# Patient Record
Sex: FEMALE | Race: WHITE | Employment: OTHER | URBAN - METROPOLITAN AREA
[De-identification: names, ages, dates, MRNs, and addresses within clinical notes are randomized per-mention and may not be internally consistent; named-entity substitution may affect disease eponyms.]

---

## 2017-06-12 ENCOUNTER — LAB ENCOUNTER (OUTPATIENT)
Dept: LAB | Facility: HOSPITAL | Age: 77
End: 2017-06-12
Attending: INTERNAL MEDICINE
Payer: MEDICARE

## 2017-06-12 ENCOUNTER — OFFICE VISIT (OUTPATIENT)
Dept: INTERNAL MEDICINE CLINIC | Facility: CLINIC | Age: 77
End: 2017-06-12

## 2017-06-12 VITALS
RESPIRATION RATE: 18 BRPM | HEIGHT: 66.6 IN | BODY MASS INDEX: 27.18 KG/M2 | HEART RATE: 54 BPM | DIASTOLIC BLOOD PRESSURE: 84 MMHG | WEIGHT: 171.13 LBS | TEMPERATURE: 97 F | SYSTOLIC BLOOD PRESSURE: 126 MMHG

## 2017-06-12 DIAGNOSIS — E78.5 HYPERLIPIDEMIA, UNSPECIFIED HYPERLIPIDEMIA TYPE: ICD-10-CM

## 2017-06-12 DIAGNOSIS — Z12.11 COLON CANCER SCREENING: ICD-10-CM

## 2017-06-12 DIAGNOSIS — M85.80 OSTEOPENIA, UNSPECIFIED LOCATION: ICD-10-CM

## 2017-06-12 DIAGNOSIS — Z00.00 ENCOUNTER FOR ANNUAL HEALTH EXAMINATION: Primary | ICD-10-CM

## 2017-06-12 DIAGNOSIS — Z23 NEED FOR VACCINATION: ICD-10-CM

## 2017-06-12 DIAGNOSIS — Z12.31 VISIT FOR SCREENING MAMMOGRAM: ICD-10-CM

## 2017-06-12 DIAGNOSIS — N95.9 MENOPAUSAL AND PERIMENOPAUSAL DISORDER: ICD-10-CM

## 2017-06-12 PROCEDURE — G0009 ADMIN PNEUMOCOCCAL VACCINE: HCPCS | Performed by: INTERNAL MEDICINE

## 2017-06-12 PROCEDURE — 85025 COMPLETE CBC W/AUTO DIFF WBC: CPT

## 2017-06-12 PROCEDURE — 84443 ASSAY THYROID STIM HORMONE: CPT

## 2017-06-12 PROCEDURE — 82607 VITAMIN B-12: CPT

## 2017-06-12 PROCEDURE — G0439 PPPS, SUBSEQ VISIT: HCPCS | Performed by: INTERNAL MEDICINE

## 2017-06-12 PROCEDURE — 80061 LIPID PANEL: CPT

## 2017-06-12 PROCEDURE — 90670 PCV13 VACCINE IM: CPT | Performed by: INTERNAL MEDICINE

## 2017-06-12 PROCEDURE — 80053 COMPREHEN METABOLIC PANEL: CPT

## 2017-06-12 PROCEDURE — 96160 PT-FOCUSED HLTH RISK ASSMT: CPT | Performed by: INTERNAL MEDICINE

## 2017-06-12 PROCEDURE — 36415 COLL VENOUS BLD VENIPUNCTURE: CPT

## 2017-06-12 RX ORDER — IMIQUIMOD 12.5 MG/.25G
1 CREAM TOPICAL NIGHTLY
COMMUNITY
Start: 2017-03-23 | End: 2017-06-12

## 2017-06-12 NOTE — PATIENT INSTRUCTIONS
Ja Flores SCREENING SCHEDULE   Tests on this list are recommended by your physician but may not be covered, or covered at this frequency, by your insurer. Please check with your insurance carrier before scheduling to verify coverage.    PREVENTATIVE often if abnormal Colonoscopy,10 Years due on 01/06/2013 Update Health Maintenance if applicable    Flex Sigmoidoscopy Screen  Covered every 5 years No results found for this or any previous visit. No flowsheet data found.      Fecal Occult Blood   Covered (Pneumovax)  Covered Once after 65 No orders found for this or any previous visit. Please get once after your 65th birthday    Hepatitis B for Moderate/High Risk       No orders found for this or any previous visit.  Medium/high risk factors:   End-stage re

## 2017-06-12 NOTE — PROGRESS NOTES
HPI:   Nakul Figueroa is a 68year old female who presents for a Medicare Subsequent Annual Wellness visit (Pt already had Initial Annual Wellness).     Patient is here requesting Medicare annual wellness visit as well as follow-up on chronic medical issue Take 1 capsule by mouth daily. Multiple Vitamin (MULTI-VITAMINS) Oral Tab Take 1 tablet by mouth daily. CALCIUM CITRATE-VITAMIN D3 OR Take 1 tablet by mouth daily. ASCORBIC ACID CR OR Take 1 tablet by mouth daily.         MEDICAL INFORMATION: the same time:    No   I have trouble understanding things on the TV:  No I have to strain to   understand conversations:  No   I have to worry about missing the telephone ring or doorbell:  No I have   trouble hearing conversations in a noisy background s bilaterally, respirations unlabored   Heart:  Regular rate and rhythm, S1 and S2 normal, no murmur, rub, or gallop   Abdomen:   Soft, non-tender, bowel sounds active all four quadrants,  no masses, no organomegaly   Pelvic:  deferred   Breast exam–normal b METABOLIC PANEL (14), LIPID PANEL, ASSAY,         THYROID STIM HORMONE, VITAMIN B12         She takes no medications. Check blood work and contact patient with results.   Work on diet and exercise.    (Z12.31) Visit for screening mammogram  Plan: EDUARD DUKE such as Influenza, Hepatitis B, Tetanus, or Pneumococcal?: No     Functional Ability     Bathing or Showering: Able without help    Toileting: Able without help    Dressing: Able without help    Eating: Able without help    Driving: Able without help    Pr quick review of chart, separate sheet to patient  [address] Internal Lab or Procedure External Lab or Procedure   Diabetes Screening      HbgA1C   Annually No results found for: A1C No flowsheet data found.     Fasting vaccine history found Please get once after your 65th birthday    Hepatitis B for Moderate/High Risk No vaccine history found Medium/high risk factors:   End-stage renal disease   Hemophiliacs who received Factor VIII or IX concentrates   Clients of instit

## 2017-06-13 ENCOUNTER — APPOINTMENT (OUTPATIENT)
Dept: LAB | Facility: HOSPITAL | Age: 77
End: 2017-06-13
Attending: INTERNAL MEDICINE
Payer: MEDICARE

## 2017-06-13 DIAGNOSIS — Z12.11 COLON CANCER SCREENING: ICD-10-CM

## 2017-06-13 PROCEDURE — 82274 ASSAY TEST FOR BLOOD FECAL: CPT

## 2017-06-19 ENCOUNTER — OFFICE VISIT (OUTPATIENT)
Dept: INTERNAL MEDICINE CLINIC | Facility: CLINIC | Age: 77
End: 2017-06-19

## 2017-06-19 VITALS
DIASTOLIC BLOOD PRESSURE: 82 MMHG | WEIGHT: 170 LBS | HEART RATE: 56 BPM | BODY MASS INDEX: 27 KG/M2 | RESPIRATION RATE: 18 BRPM | HEIGHT: 66.6 IN | SYSTOLIC BLOOD PRESSURE: 124 MMHG | TEMPERATURE: 98 F

## 2017-06-19 DIAGNOSIS — T50.Z95A ADVERSE REACTION TO VACCINE, INITIAL ENCOUNTER: Primary | ICD-10-CM

## 2017-06-19 PROCEDURE — G0463 HOSPITAL OUTPT CLINIC VISIT: HCPCS | Performed by: INTERNAL MEDICINE

## 2017-06-19 PROCEDURE — 99213 OFFICE O/P EST LOW 20 MIN: CPT | Performed by: INTERNAL MEDICINE

## 2017-06-19 RX ORDER — CEFADROXIL 500 MG/1
500 CAPSULE ORAL 2 TIMES DAILY
Qty: 14 CAPSULE | Refills: 0 | Status: SHIPPED | OUTPATIENT
Start: 2017-06-19 | End: 2017-06-26

## 2017-06-19 NOTE — PROGRESS NOTES
HPI:    Patient ID: Nakul Figueroa is a 68year old female. HPI  Patient is here complaining of redness and soreness at site of pneumonia vaccination. She was seen in the office last week on June 12. We gave her a Prevnar 13 pneumonia shot.   History of space simple sensitivity reaction. She did have a bad reaction to the tetanus shot in the past.  Advised cool compresses to the area and Benadryl. Given the time course and the potential for secondary cellulitis, we will also place her on Duricef.   Sameer Galindo

## 2017-06-19 NOTE — PATIENT INSTRUCTIONS
Benardyl 25 mg pills, 1-2 pills every 6 hours if needed. Cool compresses to the area thee times a day. Duricef. Call if not better in a couple days.

## 2017-06-27 ENCOUNTER — HOSPITAL ENCOUNTER (OUTPATIENT)
Dept: MAMMOGRAPHY | Facility: HOSPITAL | Age: 77
Discharge: HOME | End: 2017-06-27
Attending: INTERNAL MEDICINE
Payer: MEDICARE

## 2017-06-27 ENCOUNTER — HOSPITAL ENCOUNTER (OUTPATIENT)
Dept: BONE DENSITY | Facility: HOSPITAL | Age: 77
Discharge: HOME | End: 2017-06-27
Attending: INTERNAL MEDICINE
Payer: MEDICARE

## 2017-06-27 DIAGNOSIS — Z12.31 VISIT FOR SCREENING MAMMOGRAM: ICD-10-CM

## 2017-06-27 DIAGNOSIS — M85.80 OSTEOPENIA, UNSPECIFIED LOCATION: ICD-10-CM

## 2017-06-27 DIAGNOSIS — N95.9 MENOPAUSAL AND PERIMENOPAUSAL DISORDER: ICD-10-CM

## 2017-06-27 PROCEDURE — 77067 SCR MAMMO BI INCL CAD: CPT | Performed by: INTERNAL MEDICINE

## 2017-06-27 PROCEDURE — 77080 DXA BONE DENSITY AXIAL: CPT | Performed by: INTERNAL MEDICINE

## 2018-01-31 ENCOUNTER — TELEPHONE (OUTPATIENT)
Dept: INTERNAL MEDICINE CLINIC | Facility: CLINIC | Age: 78
End: 2018-01-31

## 2018-02-13 ENCOUNTER — TELEPHONE (OUTPATIENT)
Dept: INTERNAL MEDICINE CLINIC | Facility: CLINIC | Age: 78
End: 2018-02-13

## 2018-06-21 ENCOUNTER — OFFICE VISIT (OUTPATIENT)
Dept: INTERNAL MEDICINE CLINIC | Facility: CLINIC | Age: 78
End: 2018-06-21

## 2018-06-21 ENCOUNTER — LAB ENCOUNTER (OUTPATIENT)
Dept: LAB | Facility: HOSPITAL | Age: 78
End: 2018-06-21
Attending: INTERNAL MEDICINE
Payer: MEDICARE

## 2018-06-21 VITALS
SYSTOLIC BLOOD PRESSURE: 120 MMHG | WEIGHT: 168.88 LBS | DIASTOLIC BLOOD PRESSURE: 66 MMHG | RESPIRATION RATE: 18 BRPM | HEIGHT: 66.5 IN | BODY MASS INDEX: 26.82 KG/M2 | HEART RATE: 50 BPM | TEMPERATURE: 98 F

## 2018-06-21 DIAGNOSIS — E78.5 HYPERLIPIDEMIA, UNSPECIFIED HYPERLIPIDEMIA TYPE: ICD-10-CM

## 2018-06-21 DIAGNOSIS — Z00.00 ENCOUNTER FOR ANNUAL HEALTH EXAMINATION: Primary | ICD-10-CM

## 2018-06-21 PROCEDURE — 84443 ASSAY THYROID STIM HORMONE: CPT

## 2018-06-21 PROCEDURE — 85025 COMPLETE CBC W/AUTO DIFF WBC: CPT

## 2018-06-21 PROCEDURE — 96160 PT-FOCUSED HLTH RISK ASSMT: CPT | Performed by: INTERNAL MEDICINE

## 2018-06-21 PROCEDURE — 80053 COMPREHEN METABOLIC PANEL: CPT

## 2018-06-21 PROCEDURE — 36415 COLL VENOUS BLD VENIPUNCTURE: CPT

## 2018-06-21 PROCEDURE — 99397 PER PM REEVAL EST PAT 65+ YR: CPT | Performed by: INTERNAL MEDICINE

## 2018-06-21 PROCEDURE — G0439 PPPS, SUBSEQ VISIT: HCPCS | Performed by: INTERNAL MEDICINE

## 2018-06-21 PROCEDURE — 80061 LIPID PANEL: CPT

## 2018-06-21 RX ORDER — CIPROFLOXACIN 500 MG/1
TABLET, FILM COATED ORAL
COMMUNITY
Start: 2015-10-07 | End: 2018-06-21 | Stop reason: ALTCHOICE

## 2018-06-21 NOTE — PROGRESS NOTES
HPI:   Nell Frederick is a 66year old female who presents for a MA (Medicare Advantage) Supervisit (Once per calendar year). Patient is here for physical exam, AHA annual visit. Last seen here 1 year ago. She is relatively healthy.   She has some hyp Power of [employer] for [employer] on file in [address]    Advance care planning including the explanation and discussion of advance directives standard forms performed Face to Face with patient and Family/surrogate (if present), and forms available to patient i daily.        MEDICAL INFORMATION:   She  has a past medical history of Cataract. She  has a past surgical history that includes incise finger tendon sheath (10/18/2010) and Cataract extraction.     Her family history includes Cancer in her brother and f trouble understanding things on the TV:  No I have to strain to understand conversations:  No   I have to worry about missing the telephone ring or doorbell:  No I have trouble hearing conversations in a noisy background such as a crowded room or Lazarus Hoffmeister Musculoskeletal: She exhibits no tenderness. Lymphadenopathy:     She has no cervical adenopathy. Neurological: She is alert. No cranial nerve deficit. Coordination normal.   Skin: Skin is warm and dry. No rash noted.    Psychiatric: She has a normal Lab or Procedure External Lab or Procedure   Diabetes Screening      HbgA1C   Annually No results found for: A1C No flowsheet data found.     Fasting Blood Sugar (FSB)Annually   Glucose (mg/dL)   Date Value   06/12/2017 84   ----------  GLUCOSE (P) (mg/dL) after your 65th birthday    Pneumococcal 23 (Pneumovax)  Covered Once after 65 No vaccine history found Please get once after your 65th birthday    Hepatitis B for Moderate/High Risk No vaccine history found Medium/high risk factors:   End-stage renal dise

## 2018-06-21 NOTE — PATIENT INSTRUCTIONS
Moore Alert SCREENING SCHEDULE   Tests on this list are recommended by your physician but may not be covered, or covered at this frequency, by your insurer. Please check with your insurance carrier before scheduling to verify coverage.    PREVENTATIVE a family history    Colorectal Cancer Screening  Covered up to Age 76     Colonoscopy Screen   Covered every 10 years- more often if abnormal There are no preventive care reminders to display for this patient.  Update [employer] if applicable    Fle previous visit.  Please get every year    Pneumococcal 13 (Prevnar)  Covered Once after 65   Orders placed or performed in visit on 06/12/17  -PNEUMOCOCCAL VACC, 13 KEKE IM    Please get once after your 65th birthday    Pneumococcal 23 (Pneumovax)  Covered O

## 2018-08-02 ENCOUNTER — TELEPHONE (OUTPATIENT)
Dept: INTERNAL MEDICINE CLINIC | Facility: CLINIC | Age: 78
End: 2018-08-02

## 2019-01-11 ENCOUNTER — TELEPHONE (OUTPATIENT)
Dept: CASE MANAGEMENT | Age: 79
End: 2019-01-11

## 2019-01-11 NOTE — TELEPHONE ENCOUNTER
Patient is eligible for a 2019 Annual Medicare Health Assessment. Discussed in detail w/patient. Appt scheduled for 5/13/19.

## 2019-05-10 ENCOUNTER — MA CHART PREP (OUTPATIENT)
Dept: FAMILY MEDICINE CLINIC | Facility: CLINIC | Age: 79
End: 2019-05-10

## 2019-05-30 ENCOUNTER — LAB ENCOUNTER (OUTPATIENT)
Dept: LAB | Facility: HOSPITAL | Age: 79
End: 2019-05-30
Attending: INTERNAL MEDICINE
Payer: MEDICARE

## 2019-05-30 ENCOUNTER — OFFICE VISIT (OUTPATIENT)
Dept: INTERNAL MEDICINE CLINIC | Facility: CLINIC | Age: 79
End: 2019-05-30
Payer: COMMERCIAL

## 2019-05-30 VITALS
WEIGHT: 172.5 LBS | HEIGHT: 66 IN | TEMPERATURE: 98 F | SYSTOLIC BLOOD PRESSURE: 126 MMHG | DIASTOLIC BLOOD PRESSURE: 82 MMHG | HEART RATE: 52 BPM | RESPIRATION RATE: 20 BRPM | BODY MASS INDEX: 27.72 KG/M2

## 2019-05-30 DIAGNOSIS — E78.5 HYPERLIPIDEMIA, UNSPECIFIED HYPERLIPIDEMIA TYPE: ICD-10-CM

## 2019-05-30 DIAGNOSIS — Z00.00 ENCOUNTER FOR ANNUAL HEALTH EXAMINATION: Primary | ICD-10-CM

## 2019-05-30 DIAGNOSIS — Z12.31 VISIT FOR SCREENING MAMMOGRAM: ICD-10-CM

## 2019-05-30 PROCEDURE — 99397 PER PM REEVAL EST PAT 65+ YR: CPT | Performed by: INTERNAL MEDICINE

## 2019-05-30 PROCEDURE — 84443 ASSAY THYROID STIM HORMONE: CPT

## 2019-05-30 PROCEDURE — 82607 VITAMIN B-12: CPT

## 2019-05-30 PROCEDURE — G0439 PPPS, SUBSEQ VISIT: HCPCS | Performed by: INTERNAL MEDICINE

## 2019-05-30 PROCEDURE — 36415 COLL VENOUS BLD VENIPUNCTURE: CPT

## 2019-05-30 PROCEDURE — 80053 COMPREHEN METABOLIC PANEL: CPT

## 2019-05-30 PROCEDURE — 80061 LIPID PANEL: CPT

## 2019-05-30 PROCEDURE — 96160 PT-FOCUSED HLTH RISK ASSMT: CPT | Performed by: INTERNAL MEDICINE

## 2019-05-30 PROCEDURE — 85025 COMPLETE CBC W/AUTO DIFF WBC: CPT

## 2019-05-30 RX ORDER — CHLORAL HYDRATE 500 MG
CAPSULE ORAL
COMMUNITY
End: 2019-05-30

## 2019-05-30 RX ORDER — ERYTHROMYCIN 5 MG/G
OINTMENT OPHTHALMIC
COMMUNITY
Start: 2018-08-01 | End: 2019-05-30 | Stop reason: ALTCHOICE

## 2019-05-30 RX ORDER — AMOXICILLIN 500 MG/1
TABLET, FILM COATED ORAL
Refills: 1 | COMMUNITY
Start: 2019-05-04 | End: 2019-05-30 | Stop reason: ALTCHOICE

## 2019-05-30 NOTE — PROGRESS NOTES
HPI:   Trang Hudson is a 78year old female who presents for a MA (Medicare Advantage) Supervisit (Once per calendar year). Patient is here for Medicare advantage AHA visit. Seen here about 1 year ago. Underlying hyperlipidemia and arthritis.   [facility] have it in [address] [place] [address], and patient is instructed to get our office a copy of it for scanning into Epic. She smoked tobacco in the past but quit greater than 12 months ago.   Social History    Tobacco Use      Smoking status: Former Smoker      Smokeless family history includes Cancer in her brother and father; Heart Disorder in her father and mother. SOCIAL HISTORY:   She  reports that she has quit smoking. She has never used smokeless tobacco. She reports that she drinks alcohol.  She reports that she d such as a crowded room or restaurant:  No   I get confused about where sounds come from:  No I misunderstand some words in a sentence and need to ask people to repeat themselves:  No   I especially have trouble understanding the speech of women and childre tenderness. Right second toe is swollen and somewhat tender   Lymphadenopathy:     She has no cervical adenopathy. Neurological: She is alert. No cranial nerve deficit. Coordination normal.   Skin: Skin is warm and dry. No rash noted. Psychiatric:  Sh work ordered. No follow-ups on file.      Rodríguez Griggs MD, 5/30/2019     General Health     In the past six months, have you lost more than 10 pounds without trying?: 2 - No  Has your appetite been poor?: No  How does the patient maintain a good energ Pap+HPV every 5 yrs age 33-67, age 72 and older at high risk There are no preventive care reminders to display for this patient.  Update Health Maintenance if applicable    Chlamydia  Annually if high risk No results found for: CHLAMYDIA No flowsheet data f

## 2019-05-30 NOTE — PATIENT INSTRUCTIONS
Haile Rapp SCREENING SCHEDULE   Tests on this list are recommended by your physician but may not be covered, or covered at this frequency, by your insurer. Please check with your insurance carrier before scheduling to verify coverage.    PREVENTATIVE Colorectal Cancer Screening  Covered up to Age 76     Colonoscopy Screen   Covered every 10 years- more often if abnormal There are no preventive care reminders to display for this patient.  Update Health Maintenance if applicable    Flex Sigmoidoscopy Sc Please get every year    Pneumococcal 13 (Prevnar)  Covered Once after 65 Orders placed or performed in visit on 06/12/17   • PNEUMOCOCCAL VACC, 13 KEKE IM    Please get once after your 65th birthday    Pneumococcal 23 (Pneumovax)  Covered Once after 65 No

## 2019-06-15 ENCOUNTER — HOSPITAL ENCOUNTER (OUTPATIENT)
Dept: MAMMOGRAPHY | Facility: HOSPITAL | Age: 79
Discharge: HOME | End: 2019-06-15
Attending: INTERNAL MEDICINE
Payer: MEDICARE

## 2019-06-15 DIAGNOSIS — Z12.31 VISIT FOR SCREENING MAMMOGRAM: ICD-10-CM

## 2019-06-15 PROCEDURE — 77063 BREAST TOMOSYNTHESIS BI: CPT | Performed by: INTERNAL MEDICINE

## 2019-06-15 PROCEDURE — 77067 SCR MAMMO BI INCL CAD: CPT | Performed by: INTERNAL MEDICINE

## 2020-02-11 ENCOUNTER — TELEPHONE (OUTPATIENT)
Dept: CASE MANAGEMENT | Age: 80
End: 2020-02-11

## 2020-02-11 NOTE — TELEPHONE ENCOUNTER
Patient is eligible for a 2020 Medicare Advantage Supervisit. Discussed in detail w/patient. Appt scheduled for 5/11/2020.

## 2020-06-19 ENCOUNTER — TELEPHONE (OUTPATIENT)
Dept: CASE MANAGEMENT | Age: 80
End: 2020-06-19

## 2020-08-09 ENCOUNTER — MA CHART PREP (OUTPATIENT)
Dept: TELEHEALTH | Age: 80
End: 2020-08-09

## 2020-08-09 PROBLEM — Z87.891 FORMER SMOKER: Status: ACTIVE | Noted: 2020-08-09

## 2020-08-09 PROBLEM — G56.03 BILATERAL CARPAL TUNNEL SYNDROME: Status: ACTIVE | Noted: 2020-08-09

## 2020-08-10 ENCOUNTER — OFFICE VISIT (OUTPATIENT)
Dept: INTERNAL MEDICINE CLINIC | Facility: CLINIC | Age: 80
End: 2020-08-10
Payer: COMMERCIAL

## 2020-08-10 ENCOUNTER — APPOINTMENT (OUTPATIENT)
Dept: LAB | Facility: HOSPITAL | Age: 80
End: 2020-08-10
Attending: INTERNAL MEDICINE
Payer: MEDICARE

## 2020-08-10 VITALS
DIASTOLIC BLOOD PRESSURE: 79 MMHG | HEIGHT: 66 IN | BODY MASS INDEX: 26.47 KG/M2 | WEIGHT: 164.69 LBS | SYSTOLIC BLOOD PRESSURE: 123 MMHG | RESPIRATION RATE: 18 BRPM | HEART RATE: 52 BPM

## 2020-08-10 DIAGNOSIS — Z00.00 ENCOUNTER FOR ANNUAL HEALTH EXAMINATION: Primary | ICD-10-CM

## 2020-08-10 DIAGNOSIS — M19.90 OSTEOARTHRITIS, UNSPECIFIED OSTEOARTHRITIS TYPE, UNSPECIFIED SITE: ICD-10-CM

## 2020-08-10 DIAGNOSIS — D23.9 BENIGN NEOPLASM OF SKIN, UNSPECIFIED LOCATION: ICD-10-CM

## 2020-08-10 DIAGNOSIS — Z12.31 ENCOUNTER FOR SCREENING MAMMOGRAM FOR BREAST CANCER: ICD-10-CM

## 2020-08-10 DIAGNOSIS — E78.5 HYPERLIPIDEMIA, UNSPECIFIED HYPERLIPIDEMIA TYPE: ICD-10-CM

## 2020-08-10 DIAGNOSIS — Z78.0 POST-MENOPAUSE: ICD-10-CM

## 2020-08-10 LAB
ALBUMIN SERPL-MCNC: 3.4 G/DL (ref 3.4–5)
ALBUMIN/GLOB SERPL: 0.8 {RATIO} (ref 1–2)
ALP LIVER SERPL-CCNC: 77 U/L (ref 55–142)
ALT SERPL-CCNC: 29 U/L (ref 13–56)
ANION GAP SERPL CALC-SCNC: 6 MMOL/L (ref 0–18)
AST SERPL-CCNC: 21 U/L (ref 15–37)
BASOPHILS # BLD AUTO: 0.07 X10(3) UL (ref 0–0.2)
BASOPHILS NFR BLD AUTO: 1 %
BILIRUB SERPL-MCNC: 0.8 MG/DL (ref 0.1–2)
BUN BLD-MCNC: 12 MG/DL (ref 7–18)
BUN/CREAT SERPL: 14.5 (ref 10–20)
CALCIUM BLD-MCNC: 9.5 MG/DL (ref 8.5–10.1)
CHLORIDE SERPL-SCNC: 106 MMOL/L (ref 98–112)
CHOLEST SMN-MCNC: 247 MG/DL (ref ?–200)
CO2 SERPL-SCNC: 29 MMOL/L (ref 21–32)
CREAT BLD-MCNC: 0.83 MG/DL (ref 0.55–1.02)
DEPRECATED RDW RBC AUTO: 49.6 FL (ref 35.1–46.3)
EOSINOPHIL # BLD AUTO: 0.18 X10(3) UL (ref 0–0.7)
EOSINOPHIL NFR BLD AUTO: 2.7 %
ERYTHROCYTE [DISTWIDTH] IN BLOOD BY AUTOMATED COUNT: 14.2 % (ref 11–15)
GLOBULIN PLAS-MCNC: 4.2 G/DL (ref 2.8–4.4)
GLUCOSE BLD-MCNC: 82 MG/DL (ref 70–99)
HCT VFR BLD AUTO: 47.6 % (ref 35–48)
HDLC SERPL-MCNC: 91 MG/DL (ref 40–59)
HGB BLD-MCNC: 15.3 G/DL (ref 12–16)
IMM GRANULOCYTES # BLD AUTO: 0.02 X10(3) UL (ref 0–1)
IMM GRANULOCYTES NFR BLD: 0.3 %
LDLC SERPL CALC-MCNC: 133 MG/DL (ref ?–100)
LYMPHOCYTES # BLD AUTO: 2.18 X10(3) UL (ref 1–4)
LYMPHOCYTES NFR BLD AUTO: 32.2 %
M PROTEIN MFR SERPL ELPH: 7.6 G/DL (ref 6.4–8.2)
MCH RBC QN AUTO: 30.5 PG (ref 26–34)
MCHC RBC AUTO-ENTMCNC: 32.1 G/DL (ref 31–37)
MCV RBC AUTO: 95 FL (ref 80–100)
MONOCYTES # BLD AUTO: 0.57 X10(3) UL (ref 0.1–1)
MONOCYTES NFR BLD AUTO: 8.4 %
NEUTROPHILS # BLD AUTO: 3.74 X10 (3) UL (ref 1.5–7.7)
NEUTROPHILS # BLD AUTO: 3.74 X10(3) UL (ref 1.5–7.7)
NEUTROPHILS NFR BLD AUTO: 55.4 %
NONHDLC SERPL-MCNC: 156 MG/DL (ref ?–130)
OSMOLALITY SERPL CALC.SUM OF ELEC: 291 MOSM/KG (ref 275–295)
PATIENT FASTING Y/N/NP: YES
PATIENT FASTING Y/N/NP: YES
PLATELET # BLD AUTO: 286 10(3)UL (ref 150–450)
POTASSIUM SERPL-SCNC: 4.5 MMOL/L (ref 3.5–5.1)
RBC # BLD AUTO: 5.01 X10(6)UL (ref 3.8–5.3)
SODIUM SERPL-SCNC: 141 MMOL/L (ref 136–145)
TRIGL SERPL-MCNC: 116 MG/DL (ref 30–149)
TSI SER-ACNC: 1.3 MIU/ML (ref 0.36–3.74)
VLDLC SERPL CALC-MCNC: 23 MG/DL (ref 0–30)
WBC # BLD AUTO: 6.8 X10(3) UL (ref 4–11)

## 2020-08-10 PROCEDURE — 36415 COLL VENOUS BLD VENIPUNCTURE: CPT | Performed by: INTERNAL MEDICINE

## 2020-08-10 PROCEDURE — 85025 COMPLETE CBC W/AUTO DIFF WBC: CPT | Performed by: INTERNAL MEDICINE

## 2020-08-10 PROCEDURE — G0439 PPPS, SUBSEQ VISIT: HCPCS | Performed by: INTERNAL MEDICINE

## 2020-08-10 PROCEDURE — 3008F BODY MASS INDEX DOCD: CPT | Performed by: INTERNAL MEDICINE

## 2020-08-10 PROCEDURE — 80053 COMPREHEN METABOLIC PANEL: CPT | Performed by: INTERNAL MEDICINE

## 2020-08-10 PROCEDURE — 96160 PT-FOCUSED HLTH RISK ASSMT: CPT | Performed by: INTERNAL MEDICINE

## 2020-08-10 PROCEDURE — 3074F SYST BP LT 130 MM HG: CPT | Performed by: INTERNAL MEDICINE

## 2020-08-10 PROCEDURE — 84443 ASSAY THYROID STIM HORMONE: CPT | Performed by: INTERNAL MEDICINE

## 2020-08-10 PROCEDURE — 3078F DIAST BP <80 MM HG: CPT | Performed by: INTERNAL MEDICINE

## 2020-08-10 PROCEDURE — 80061 LIPID PANEL: CPT | Performed by: INTERNAL MEDICINE

## 2020-08-10 PROCEDURE — 99397 PER PM REEVAL EST PAT 65+ YR: CPT | Performed by: INTERNAL MEDICINE

## 2020-08-10 NOTE — PATIENT INSTRUCTIONS
Thu Galarza SCREENING SCHEDULE   Tests on this list are recommended by your physician but may not be covered, or covered at this frequency, by your insurer. Please check with your insurance carrier before scheduling to verify coverage.    PREVENTATIVE abnormal There are no preventive care reminders to display for this patient. Update Health Maintenance if applicable    Flex Sigmoidoscopy Screen  Covered every 5 years No results found for this or any previous visit. No flowsheet data found.      Fecal Occ 06/12/17   • PNEUMOCOCCAL VACC, 13 KEKE IM    Please get once after your 65th birthday    Pneumococcal 23 (Pneumovax)  Covered Once after 65 No orders found for this or any previous visit.  Please get once after your 65th birthday    Hepatitis B for Moderate

## 2020-08-10 NOTE — PROGRESS NOTES
HPI:   Robin Frankel is a [de-identified]year old female who presents for a MA (Medicare Advantage) Supervisit (Once per calendar year). Patient is here for Medicare advantage AHA visit and follow-up on chronic medical problems. Last seen here in May 2019.   [facility] Alcohol screening   Josiane Landers was screened for Alcohol abuse and had a score of 0 so is at low risk.     Patient Care Team: Patient Care Team:  Fatuma Lilly MD as PCP - General (Internal Medicine)    Patient Active Problem List:     Hyper cough and shortness of breath. Cardiovascular: Negative for chest pain and palpitations. Gastrointestinal: Negative for nausea, vomiting, abdominal pain, diarrhea and constipation.    Genitourinary: Negative for dysuria, hematuria, vaginal discharge, m avoid social activities because I cannot hear well and fear I will reply improperly:  No   Family members and friends have told me they think I may have hearing loss:   No                Visual Acuity  Right Eye Visual Acuity: Corrected Right Eye Chart Acui health examination  Physical exam is unremarkable. Active issues as below. Health maintenance issues reviewed. Overall the patient is doing quite well for her age. Not much in the way of chronic medical issues.   Hands are better since the cortisone fawad SERVICES  INDICATIONS AND SCHEDULE Internal Lab or Procedure External Lab or Procedure   Diabetes Screening      HbgA1C   Annually No results found for: A1C No flowsheet data found.     Fasting Blood Sugar (FSB)Annually Glucose (mg/dL)   Date Value   05/30/ (Pneumovax)  Covered Once after 65 No vaccine history found Please get once after your 65th birthday    Hepatitis B for Moderate/High Risk No vaccine history found Medium/high risk factors:   End-stage renal disease   Hemophiliacs who received Factor VIII

## 2020-08-11 NOTE — PROGRESS NOTES
Please send letter  Mike Quigley    The blood cell count is normal. There is no evidence of anemia or infection. The blood sugar (glucose) level is normal. There is no evidence of diabetes.     The electrolytes levels in the blood are normal.    The kid

## 2020-08-25 ENCOUNTER — HOSPITAL ENCOUNTER (OUTPATIENT)
Dept: MAMMOGRAPHY | Facility: HOSPITAL | Age: 80
Discharge: HOME | End: 2020-08-25
Attending: INTERNAL MEDICINE
Payer: MEDICARE

## 2020-08-25 ENCOUNTER — HOSPITAL ENCOUNTER (OUTPATIENT)
Dept: BONE DENSITY | Facility: HOSPITAL | Age: 80
Discharge: HOME | End: 2020-08-25
Attending: INTERNAL MEDICINE
Payer: MEDICARE

## 2020-08-25 DIAGNOSIS — Z78.0 POST-MENOPAUSE: ICD-10-CM

## 2020-08-25 DIAGNOSIS — Z12.31 ENCOUNTER FOR SCREENING MAMMOGRAM FOR BREAST CANCER: ICD-10-CM

## 2020-08-25 PROCEDURE — 77063 BREAST TOMOSYNTHESIS BI: CPT | Performed by: INTERNAL MEDICINE

## 2020-08-25 PROCEDURE — 77080 DXA BONE DENSITY AXIAL: CPT | Performed by: INTERNAL MEDICINE

## 2020-08-25 PROCEDURE — 77067 SCR MAMMO BI INCL CAD: CPT | Performed by: INTERNAL MEDICINE

## 2020-12-03 ENCOUNTER — OFFICE VISIT (OUTPATIENT)
Dept: DERMATOLOGY CLINIC | Facility: CLINIC | Age: 80
End: 2020-12-03
Payer: COMMERCIAL

## 2020-12-03 ENCOUNTER — TELEPHONE (OUTPATIENT)
Dept: DERMATOLOGY CLINIC | Facility: CLINIC | Age: 80
End: 2020-12-03

## 2020-12-03 DIAGNOSIS — D23.4 BENIGN NEOPLASM OF SCALP AND SKIN OF NECK: ICD-10-CM

## 2020-12-03 DIAGNOSIS — L82.1 SEBORRHEIC KERATOSES: ICD-10-CM

## 2020-12-03 DIAGNOSIS — D23.60 BENIGN NEOPLASM OF SKIN OF UPPER LIMB, INCLUDING SHOULDER, UNSPECIFIED LATERALITY: ICD-10-CM

## 2020-12-03 DIAGNOSIS — D23.30 BENIGN NEOPLASM OF SKIN OF FACE: ICD-10-CM

## 2020-12-03 DIAGNOSIS — D23.5 BENIGN NEOPLASM OF SKIN OF TRUNK, EXCEPT SCROTUM: ICD-10-CM

## 2020-12-03 DIAGNOSIS — D18.01 HEMANGIOMA OF LIP: Primary | ICD-10-CM

## 2020-12-03 PROCEDURE — G0463 HOSPITAL OUTPT CLINIC VISIT: HCPCS | Performed by: DERMATOLOGY

## 2020-12-03 PROCEDURE — 99202 OFFICE O/P NEW SF 15 MIN: CPT | Performed by: DERMATOLOGY

## 2020-12-03 NOTE — TELEPHONE ENCOUNTER
Spoke with patient. Verified name and . Informed patient per Dr. Charlotte Henao to schedule an appointment with Dr. Nhi Jeter at [phone]. Also gave patient Dr. Gerald Tomlin office address [address] [place] [address], 4th floor.  Patient verbalizes understanding and states will

## 2020-12-04 ENCOUNTER — OFFICE VISIT (OUTPATIENT)
Dept: OTOLARYNGOLOGY | Facility: CLINIC | Age: 80
End: 2020-12-04
Payer: COMMERCIAL

## 2020-12-04 VITALS
SYSTOLIC BLOOD PRESSURE: 121 MMHG | HEART RATE: 73 BPM | WEIGHT: 160 LBS | HEIGHT: 65 IN | BODY MASS INDEX: 26.66 KG/M2 | DIASTOLIC BLOOD PRESSURE: 79 MMHG

## 2020-12-04 DIAGNOSIS — K13.0 LIP LESION: Primary | ICD-10-CM

## 2020-12-04 PROCEDURE — 3074F SYST BP LT 130 MM HG: CPT | Performed by: OTOLARYNGOLOGY

## 2020-12-04 PROCEDURE — 3078F DIAST BP <80 MM HG: CPT | Performed by: OTOLARYNGOLOGY

## 2020-12-04 PROCEDURE — 99203 OFFICE O/P NEW LOW 30 MIN: CPT | Performed by: OTOLARYNGOLOGY

## 2020-12-04 PROCEDURE — 3008F BODY MASS INDEX DOCD: CPT | Performed by: OTOLARYNGOLOGY

## 2020-12-04 PROCEDURE — G0463 HOSPITAL OUTPT CLINIC VISIT: HCPCS | Performed by: OTOLARYNGOLOGY

## 2020-12-04 NOTE — PROGRESS NOTES
Kyleigh George is a [de-identified]year old female.   Patient presents with:  Consult: blister on center top lip ,denies itch ,bleed or pain ,this developed  after an injury above her lip 7-8 years ago       HISTORY OF PRESENT ILLNESS    She presents with a history of t depression. Integumentary Negative Frequent skin infections, pigment change and rash. Hema/Lymph Negative Easy bleeding and easy bruising.            PHYSICAL EXAM    /79   Pulse 73   Ht 5' 5\" (1.651 m)   Wt 160 lb (72.6 kg)   BMI 26.63 kg/m² have asked her return to see me tomorrow and we will remove this in the office under local anesthesia. This note was prepared using [employer] [facility] voice recognition dictation software. As a result errors may occur.  When identified these errors have

## 2020-12-05 ENCOUNTER — OFFICE VISIT (OUTPATIENT)
Dept: OTOLARYNGOLOGY | Facility: CLINIC | Age: 80
End: 2020-12-05
Payer: COMMERCIAL

## 2020-12-05 VITALS
HEART RATE: 60 BPM | WEIGHT: 160 LBS | BODY MASS INDEX: 27 KG/M2 | TEMPERATURE: 97 F | SYSTOLIC BLOOD PRESSURE: 140 MMHG | DIASTOLIC BLOOD PRESSURE: 78 MMHG

## 2020-12-05 DIAGNOSIS — K13.0 LIP LESION: Primary | ICD-10-CM

## 2020-12-05 PROCEDURE — 40812 EXCISE/REPAIR MOUTH LESION: CPT | Performed by: OTOLARYNGOLOGY

## 2020-12-05 PROCEDURE — 3078F DIAST BP <80 MM HG: CPT | Performed by: OTOLARYNGOLOGY

## 2020-12-05 PROCEDURE — 3077F SYST BP >= 140 MM HG: CPT | Performed by: OTOLARYNGOLOGY

## 2020-12-05 RX ORDER — CEPHALEXIN 500 MG/1
500 CAPSULE ORAL EVERY 8 HOURS
Qty: 21 CAPSULE | Refills: 0 | Status: SHIPPED | OUTPATIENT
Start: 2020-12-05 | End: 2021-07-02 | Stop reason: ALTCHOICE

## 2020-12-05 NOTE — PROGRESS NOTES
Sherrie Maloney is a [de-identified]year old female.   Patient presents with:  Procedure: Excision upper lip removal      HISTORY OF PRESENT ILLNESS     She presents with a history of trauma to her lip 7 to 8 years ago and now has a small bump/lesion of the upper lip at Anxiety and depression. Integumentary Negative Frequent skin infections, pigment change and rash. Hema/Lymph Negative Easy bleeding and easy bruising.            PHYSICAL EXAM    /78   Pulse 60   Temp 96.7 °F (35.9 °C)   Wt 160 lb (72.6 kg)   BMI the orbicularis oris. Any bleeding sites were controlled using cautery. The mucosal edges were then approximated and closed in a running locked fashion using 4-0 Chromic Gut suture taking care to bring together the lip vermilion appropriately.   No compli

## 2020-12-13 NOTE — PROGRESS NOTES
Aura Valerio is a [de-identified]year old female. HPI:     CC:  Patient presents with:  Full Skin Exam: new pt, last seen in 2016, presenting for skin exam. Denies hx of skin CA. Concerned about a \"blood blister\" to upper lip resulting from trauma.          Rudolph Santiago Financial resource strain: Not on file      Food insecurity        Worry: Not on file        Inability: Not on file      Transportation needs        Medical: Not on file        Non-medical: Not on file    Tobacco Use      Smoking status: Former Smoker Father         STOMACH   • Heart Disorder Father         premature CAD   • Heart Disorder Mother    • Cancer Brother        There were no vitals filed for this visit.     HPI:    Patient presents with:  Full Skin Exam: new pt, last seen in 2016, presenting bilaterally    Lesion of concern in the groin small cystic papule. Multiple waxy tan papules. Numerous dermatofibromas over the lower extremities cystic papule at left neck    Otherwise remarkable for lesions as noted on map.   See Assessment /Plan for ad indicates understanding of these issues and agrees to the plan. The patient is asked to return as noted in follow-up/ above. This note was generated using Dragon voice recognition software.   Please contact me regarding any confusion resulting from erro

## 2021-03-04 ENCOUNTER — TELEPHONE (OUTPATIENT)
Dept: CASE MANAGEMENT | Age: 81
End: 2021-03-04

## 2021-03-04 NOTE — TELEPHONE ENCOUNTER
Patient informed is eligible for 2021 Medicare Advantage Supervisit, requesting August appointment, scheduled for 8/13/2021.

## 2021-07-02 ENCOUNTER — OFFICE VISIT (OUTPATIENT)
Dept: DERMATOLOGY CLINIC | Facility: CLINIC | Age: 81
End: 2021-07-02
Payer: COMMERCIAL

## 2021-07-02 DIAGNOSIS — D22.9 BENIGN MOLE: ICD-10-CM
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
## 2021-07-02 DIAGNOSIS — S70.361A INSECT BITE OF RIGHT THIGH, INITIAL ENCOUNTER: Primary | ICD-10-CM

## 2021-07-02 PROCEDURE — 99213 OFFICE O/P EST LOW 20 MIN: CPT | Performed by: PHYSICIAN ASSISTANT

## 2021-07-02 NOTE — PROGRESS NOTES
HPI:    Patient ID: Marilynn Clayton is a 80year old female. Patient presents with insect bite on both thighs. Has been present for the past 1 week or so. Does not recall a bug bite on her thigh. No draining. No tenderness noted.  She also notes irritation discussion with patient, advised the following:  -Told to apply mupirocin   -Educated to apply 2 times per day for the next 1 week. -To apply triamcinolone 1-2 times per day for the next 2 weeks.    -Return in 2 weeks.   -To call or follow-up with worseni

## 2021-07-16 ENCOUNTER — OFFICE VISIT (OUTPATIENT)
Dept: DERMATOLOGY CLINIC | Facility: CLINIC | Age: 81
End: 2021-07-16
Payer: COMMERCIAL

## 2021-07-16 DIAGNOSIS — S70.361D INSECT BITE OF RIGHT THIGH, SUBSEQUENT ENCOUNTER: Primary | ICD-10-CM

## 2021-07-16 DIAGNOSIS — D22.9 BENIGN MOLE: ICD-10-CM
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
## 2021-07-16 PROCEDURE — 99213 OFFICE O/P EST LOW 20 MIN: CPT | Performed by: PHYSICIAN ASSISTANT

## 2021-07-16 NOTE — PROGRESS NOTES
HPI:    Patient ID: Heather Bird is a 80year old female. Patient presents for follow-up on insect bite. She feels it has improved. She is using triamcinolone and mupirocin with improvement. No draining. No pain. Hx of allergies to medications noted. -Pt was agreeable to plan and will comply with discussion above. 2. Benign mole  -After discussion with patient, advised the following:  -Benign in nature.   -Will wait to have this removed. -To call when she wants this removed.    -Pt was agreeab

## 2021-10-22 ENCOUNTER — OFFICE VISIT (OUTPATIENT)
Dept: INTERNAL MEDICINE CLINIC | Facility: CLINIC | Age: 81
End: 2021-10-22
Payer: COMMERCIAL

## 2021-10-22 ENCOUNTER — LAB ENCOUNTER (OUTPATIENT)
Dept: LAB | Facility: HOSPITAL | Age: 81
End: 2021-10-22
Attending: INTERNAL MEDICINE
Payer: MEDICARE

## 2021-10-22 VITALS
RESPIRATION RATE: 20 BRPM | BODY MASS INDEX: 27.76 KG/M2 | WEIGHT: 166.63 LBS | HEART RATE: 54 BPM | SYSTOLIC BLOOD PRESSURE: 126 MMHG | DIASTOLIC BLOOD PRESSURE: 78 MMHG | TEMPERATURE: 98 F | HEIGHT: 65 IN

## 2021-10-22 DIAGNOSIS — E78.5 HYPERLIPIDEMIA, UNSPECIFIED HYPERLIPIDEMIA TYPE: ICD-10-CM

## 2021-10-22 DIAGNOSIS — Z00.00 ENCOUNTER FOR ANNUAL HEALTH EXAMINATION: Primary | ICD-10-CM

## 2021-10-22 DIAGNOSIS — M19.90 OSTEOARTHRITIS, UNSPECIFIED OSTEOARTHRITIS TYPE, UNSPECIFIED SITE: ICD-10-CM

## 2021-10-22 DIAGNOSIS — M85.80 OSTEOPENIA, UNSPECIFIED LOCATION: ICD-10-CM

## 2021-10-22 DIAGNOSIS — Z12.31 ENCOUNTER FOR SCREENING MAMMOGRAM FOR BREAST CANCER: ICD-10-CM

## 2021-10-22 PROCEDURE — 96160 PT-FOCUSED HLTH RISK ASSMT: CPT | Performed by: INTERNAL MEDICINE

## 2021-10-22 PROCEDURE — 82607 VITAMIN B-12: CPT | Performed by: INTERNAL MEDICINE

## 2021-10-22 PROCEDURE — 3008F BODY MASS INDEX DOCD: CPT | Performed by: INTERNAL MEDICINE

## 2021-10-22 PROCEDURE — G0439 PPPS, SUBSEQ VISIT: HCPCS | Performed by: INTERNAL MEDICINE

## 2021-10-22 PROCEDURE — 99397 PER PM REEVAL EST PAT 65+ YR: CPT | Performed by: INTERNAL MEDICINE

## 2021-10-22 PROCEDURE — 3074F SYST BP LT 130 MM HG: CPT | Performed by: INTERNAL MEDICINE

## 2021-10-22 PROCEDURE — 36415 COLL VENOUS BLD VENIPUNCTURE: CPT | Performed by: INTERNAL MEDICINE

## 2021-10-22 PROCEDURE — 80061 LIPID PANEL: CPT | Performed by: INTERNAL MEDICINE

## 2021-10-22 PROCEDURE — 82306 VITAMIN D 25 HYDROXY: CPT

## 2021-10-22 PROCEDURE — 85025 COMPLETE CBC W/AUTO DIFF WBC: CPT | Performed by: INTERNAL MEDICINE

## 2021-10-22 PROCEDURE — 3078F DIAST BP <80 MM HG: CPT | Performed by: INTERNAL MEDICINE

## 2021-10-22 PROCEDURE — 80053 COMPREHEN METABOLIC PANEL: CPT | Performed by: INTERNAL MEDICINE

## 2021-10-22 PROCEDURE — 84443 ASSAY THYROID STIM HORMONE: CPT | Performed by: INTERNAL MEDICINE

## 2021-10-22 NOTE — PATIENT INSTRUCTIONS
Selma Cueto SCREENING SCHEDULE   Tests on this list are recommended by your physician but may not be covered, or covered at this frequency, by your insurer. Please check with your insurance carrier before scheduling to verify coverage.    PREVENTATI 08/25/2020      No recommendations at this time   Pap and Pelvic    Pap   Covered every 2 years for women at normal risk;  Annually if at high risk -  No recommendations at this time    Chlamydia Annually if high risk -  No recommendations at this time   Sc regarding Advance Directives. FOR ARTHRITIS PAIN, TRY TYLENOL ARTHRITIS, 2 PILLS TAKEN 2 OR 3 TIMES A DAY. CALL THE OFFICE IF THAT DOES NOT WORK. WE WOULD THEN CONSIDER A DAILY PRESCRIPTION MEDICATION LIKE MOBIC (MELOXICAM).

## 2021-10-22 NOTE — PROGRESS NOTES
HPI:   Chela Lambert is a 80year old female who presents for a MA (Medicare Advantage) Supervisit (Once per calendar year). Patient is here requesting Medicare advantage AHA visit and follow-up on chronic medical issues as listed below.   Last seen h to Face with patient and Family/surrogate (if present), and forms available to patient in AVS     She does NOT have a Power of  for [employer] on file in [address] [place] [address] Rd.    Advance care planning including the explanation and discussion of advance directives TOOTHBRUSH QD  CALCIUM CITRATE-VITAMIN D3 OR, Take 1 tablet by mouth daily. ASCORBIC ACID CR OR, Take 1 tablet by mouth daily. MEDICAL INFORMATION:   She  has a past medical history of Cataract.     She  has a past surgical history that includes background such as a crowded room or restaurant: No   I get confused about where sounds come from: No I misunderstand some words in a sentence and need to ask people to repeat themselves: No   I especially have trouble understanding the speech of women and lower leg: No edema. Lymphadenopathy:      Cervical: No cervical adenopathy. Skin:     General: Skin is warm and dry. Findings: No rash. Neurological:      Mental Status: She is alert. Cranial Nerves: No cranial nerve deficit.       Coordina fails and we would consider meloxicam once a day. 4. Encounter for screening mammogram for breast cancer  Given order for mammogram.  - [facility] SCREENING BILAT (CPT=77067); Future    5. Osteopenia, unspecified location  Check vitamin D level.   Continue with Screening  Covered for ages 52-80; only need ONE of the following:    Colonoscopy   Covered every 10 years    Covered every 2 years if patient is at high risk or previous colonoscopy was abnormal -    No recommendations at this time    Flexible Sigmoidosco

## 2021-10-28 ENCOUNTER — HOSPITAL ENCOUNTER (OUTPATIENT)
Dept: MAMMOGRAPHY | Facility: HOSPITAL | Age: 81
Discharge: HOME | End: 2021-10-28
Attending: INTERNAL MEDICINE
Payer: MEDICARE

## 2021-10-28 DIAGNOSIS — Z12.31 ENCOUNTER FOR SCREENING MAMMOGRAM FOR BREAST CANCER: ICD-10-CM

## 2021-10-28 PROCEDURE — 77067 SCR MAMMO BI INCL CAD: CPT | Performed by: INTERNAL MEDICINE

## 2021-10-28 PROCEDURE — 77063 BREAST TOMOSYNTHESIS BI: CPT | Performed by: INTERNAL MEDICINE

## 2022-10-04 ENCOUNTER — ORDER TRANSCRIPTION (OUTPATIENT)
Dept: ADMINISTRATIVE | Facility: HOSPITAL | Age: 82
End: 2022-10-04

## 2022-10-04 DIAGNOSIS — Z12.31 ENCOUNTER FOR SCREENING MAMMOGRAM FOR MALIGNANT NEOPLASM OF BREAST: Primary | ICD-10-CM

## 2022-11-04 ENCOUNTER — HOSPITAL ENCOUNTER (OUTPATIENT)
Dept: MAMMOGRAPHY | Facility: HOSPITAL | Age: 82
Discharge: HOME | End: 2022-11-04
Attending: INTERNAL MEDICINE
Payer: MEDICARE

## 2022-11-04 DIAGNOSIS — Z12.31 ENCOUNTER FOR SCREENING MAMMOGRAM FOR MALIGNANT NEOPLASM OF BREAST: ICD-10-CM

## 2022-11-04 PROCEDURE — 77067 SCR MAMMO BI INCL CAD: CPT | Performed by: INTERNAL MEDICINE

## 2022-11-04 PROCEDURE — 77063 BREAST TOMOSYNTHESIS BI: CPT | Performed by: INTERNAL MEDICINE

## 2022-11-28 ENCOUNTER — LAB ENCOUNTER (OUTPATIENT)
Dept: LAB | Facility: HOSPITAL | Age: 82
End: 2022-11-28
Attending: INTERNAL MEDICINE
Payer: MEDICARE

## 2022-11-28 ENCOUNTER — OFFICE VISIT (OUTPATIENT)
Facility: CLINIC | Age: 82
End: 2022-11-28
Payer: COMMERCIAL

## 2022-11-28 VITALS
RESPIRATION RATE: 20 BRPM | HEART RATE: 60 BPM | TEMPERATURE: 98 F | HEIGHT: 65 IN | DIASTOLIC BLOOD PRESSURE: 78 MMHG | SYSTOLIC BLOOD PRESSURE: 130 MMHG | WEIGHT: 170.38 LBS | BODY MASS INDEX: 28.39 KG/M2

## 2022-11-28 DIAGNOSIS — Z00.00 ENCOUNTER FOR ANNUAL HEALTH EXAMINATION: Primary | ICD-10-CM

## 2022-11-28 DIAGNOSIS — M19.90 OSTEOARTHRITIS, UNSPECIFIED OSTEOARTHRITIS TYPE, UNSPECIFIED SITE: ICD-10-CM

## 2022-11-28 DIAGNOSIS — E78.5 HYPERLIPIDEMIA, UNSPECIFIED HYPERLIPIDEMIA TYPE: ICD-10-CM

## 2022-11-28 DIAGNOSIS — M85.80 OSTEOPENIA, UNSPECIFIED LOCATION: ICD-10-CM

## 2022-11-28 DIAGNOSIS — Z23 NEED FOR VACCINATION: ICD-10-CM

## 2022-11-28 LAB
ALBUMIN SERPL-MCNC: 3.4 G/DL (ref 3.4–5)
ALBUMIN/GLOB SERPL: 0.9 {RATIO} (ref 1–2)
ALP LIVER SERPL-CCNC: 75 U/L
ALT SERPL-CCNC: 20 U/L
ANION GAP SERPL CALC-SCNC: 6 MMOL/L (ref 0–18)
AST SERPL-CCNC: 20 U/L (ref 15–37)
BASOPHILS # BLD AUTO: 0.07 X10(3) UL (ref 0–0.2)
BASOPHILS NFR BLD AUTO: 1 %
BILIRUB SERPL-MCNC: 0.6 MG/DL (ref 0.1–2)
BUN BLD-MCNC: 15 MG/DL (ref 7–18)
BUN/CREAT SERPL: 19.7 (ref 10–20)
CALCIUM BLD-MCNC: 9.5 MG/DL (ref 8.5–10.1)
CHLORIDE SERPL-SCNC: 108 MMOL/L (ref 98–112)
CHOLEST SERPL-MCNC: 222 MG/DL (ref ?–200)
CO2 SERPL-SCNC: 26 MMOL/L (ref 21–32)
CREAT BLD-MCNC: 0.76 MG/DL
DEPRECATED RDW RBC AUTO: 45.7 FL (ref 35.1–46.3)
EOSINOPHIL # BLD AUTO: 0.2 X10(3) UL (ref 0–0.7)
EOSINOPHIL NFR BLD AUTO: 2.9 %
ERYTHROCYTE [DISTWIDTH] IN BLOOD BY AUTOMATED COUNT: 13.3 % (ref 11–15)
FASTING PATIENT LIPID ANSWER: YES
FASTING STATUS PATIENT QL REPORTED: YES
GFR SERPLBLD BASED ON 1.73 SQ M-ARVRAT: 78 ML/MIN/1.73M2 (ref 60–?)
GLOBULIN PLAS-MCNC: 3.7 G/DL (ref 2.8–4.4)
GLUCOSE BLD-MCNC: 87 MG/DL (ref 70–99)
HCT VFR BLD AUTO: 45.7 %
HDLC SERPL-MCNC: 81 MG/DL (ref 40–59)
HGB BLD-MCNC: 14.9 G/DL
IMM GRANULOCYTES # BLD AUTO: 0.01 X10(3) UL (ref 0–1)
IMM GRANULOCYTES NFR BLD: 0.1 %
LDLC SERPL CALC-MCNC: 120 MG/DL (ref ?–100)
LYMPHOCYTES # BLD AUTO: 2.18 X10(3) UL (ref 1–4)
LYMPHOCYTES NFR BLD AUTO: 31.3 %
MCH RBC QN AUTO: 30.4 PG (ref 26–34)
MCHC RBC AUTO-ENTMCNC: 32.6 G/DL (ref 31–37)
MCV RBC AUTO: 93.3 FL
MONOCYTES # BLD AUTO: 0.49 X10(3) UL (ref 0.1–1)
MONOCYTES NFR BLD AUTO: 7 %
NEUTROPHILS # BLD AUTO: 4.01 X10 (3) UL (ref 1.5–7.7)
NEUTROPHILS # BLD AUTO: 4.01 X10(3) UL (ref 1.5–7.7)
NEUTROPHILS NFR BLD AUTO: 57.7 %
NONHDLC SERPL-MCNC: 141 MG/DL (ref ?–130)
OSMOLALITY SERPL CALC.SUM OF ELEC: 290 MOSM/KG (ref 275–295)
PLATELET # BLD AUTO: 333 10(3)UL (ref 150–450)
POTASSIUM SERPL-SCNC: 4.5 MMOL/L (ref 3.5–5.1)
PROT SERPL-MCNC: 7.1 G/DL (ref 6.4–8.2)
RBC # BLD AUTO: 4.9 X10(6)UL
SODIUM SERPL-SCNC: 140 MMOL/L (ref 136–145)
TRIGL SERPL-MCNC: 120 MG/DL (ref 30–149)
TSI SER-ACNC: 1.24 MIU/ML (ref 0.36–3.74)
VLDLC SERPL CALC-MCNC: 21 MG/DL (ref 0–30)
WBC # BLD AUTO: 7 X10(3) UL (ref 4–11)

## 2022-11-28 PROCEDURE — 80061 LIPID PANEL: CPT | Performed by: INTERNAL MEDICINE

## 2022-11-28 PROCEDURE — 80053 COMPREHEN METABOLIC PANEL: CPT | Performed by: INTERNAL MEDICINE

## 2022-11-28 PROCEDURE — 84443 ASSAY THYROID STIM HORMONE: CPT | Performed by: INTERNAL MEDICINE

## 2022-11-28 PROCEDURE — 85025 COMPLETE CBC W/AUTO DIFF WBC: CPT | Performed by: INTERNAL MEDICINE

## 2022-11-28 PROCEDURE — 36415 COLL VENOUS BLD VENIPUNCTURE: CPT | Performed by: INTERNAL MEDICINE

## 2022-12-17 LAB — AMB EXT COVID-19 RESULT: DETECTED

## 2022-12-19 ENCOUNTER — NURSE TRIAGE (OUTPATIENT)
Dept: INTERNAL MEDICINE CLINIC | Facility: CLINIC | Age: 82
End: 2022-12-19

## 2022-12-19 ENCOUNTER — VIRTUAL PHONE E/M (OUTPATIENT)
Dept: INTERNAL MEDICINE CLINIC | Facility: CLINIC | Age: 82
End: 2022-12-19
Payer: COMMERCIAL

## 2022-12-19 DIAGNOSIS — U07.1 COVID-19: Primary | ICD-10-CM

## 2022-12-19 PROCEDURE — 99442 PHONE E/M BY PHYS 11-20 MIN: CPT | Performed by: NURSE PRACTITIONER

## 2022-12-19 RX ORDER — NIRMATRELVIR AND RITONAVIR 300-100 MG
KIT ORAL
Qty: 30 TABLET | Refills: 0 | Status: SHIPPED | OUTPATIENT
Start: 2022-12-19 | End: 2022-12-24

## 2022-12-19 NOTE — TELEPHONE ENCOUNTER
Patient tried to get into [facility] for her appointment and was not able to so RN switched appointment to phone call.     [phone]

## 2022-12-19 NOTE — PATIENT INSTRUCTIONS
Diagnoses and all orders for this visit:    COVID-19  Patient directed to isolate away form any household members as much as possible and the general public COMPLETELY for 5 days. If you have no symptoms or your symptoms are resolving after 5 days your can leave your house with mask for an additional 5 days. If you have a fever, continue to stay home until your fever resolves. Avoid any close contacts. Avoid gathering at this time. Keep the distance of at least 6 feet with other people including family member in the house or outside. Frequent handwashing. Instructed that if patient develops chest pain, altered mental status/confusion, or if unable to speak full sentence due to shortness of breath, should to come to emergency department. May take Tylenol 500 mg every 6 hours as needed for pain/temperature. Unless contraindicated due to chronic illness. Max 3000 mg in 24 hours. Take Vitamin C and D. Please get plenty of rest and increase your intake of oral fluids. Take paxlovid as prescribed. Madhu Seymour advised to follow CDC guidelines for self isolation and symptomatic treatment as outlined on CDC Patient Guidelines. Madhu Seymour understands phone evaluation is not a substitute for face-to-face examination or emergency care. Patient advised to go to ER or call 911 for worsening symptoms or acute distress. KANE Schmitt    Other orders  -     nirmatrelvir&ritonavir 300/100 (PAXLOVID, 300/100,) 20 x 150 MG & 10 x 100MG Oral Tablet Therapy Pack; Take two nirmatrelvir tablets (300mg) with one ritonavir tablet (100mg) together twice daily for 5 days.

## 2023-01-09 ENCOUNTER — TELEPHONE (OUTPATIENT)
Dept: INTERNAL MEDICINE CLINIC | Facility: CLINIC | Age: 83
End: 2023-01-09

## 2023-01-09 NOTE — TELEPHONE ENCOUNTER
Patient tested positive for COVID on 12/19/2022 and had a televisit with Damian Yeung on 12/19/2022. Was prescribed paxlovid. Patient's symptoms have cleared up. Wanted to know if needs to wait 90days before getting the COVID booster vaccine or if can get it now? Please advise. Only found that in the past that had to wait 90 days after the monoclonal antibodies but could not find information on CDC website if had to wait with the oral medications-paxlovid for the COVID booster.

## 2023-01-10 NOTE — TELEPHONE ENCOUNTER
Call the patient. I am not aware that you have to wait after Paxlovid or after a COVID infection to go ahead and get the COVID shot. Once you have cleared the infection and feel mostly back to normal, you are okay to get the shot.   On the other hand, the COVID infection itself will give you good immunity for 3 months at least.

## 2023-05-05 ENCOUNTER — OFFICE VISIT (OUTPATIENT)
Dept: DERMATOLOGY CLINIC | Facility: CLINIC | Age: 83
End: 2023-05-05

## 2023-05-05 DIAGNOSIS — L82.1 SEBORRHEIC KERATOSIS: ICD-10-CM

## 2023-05-05 DIAGNOSIS — Z12.83 SCREENING EXAM FOR SKIN CANCER: Primary | ICD-10-CM

## 2023-05-05 DIAGNOSIS — D18.01 HEMANGIOMA OF SKIN: ICD-10-CM

## 2023-05-05 PROCEDURE — 99213 OFFICE O/P EST LOW 20 MIN: CPT | Performed by: PHYSICIAN ASSISTANT

## 2023-05-05 PROCEDURE — 17110 DESTRUCTION B9 LES UP TO 14: CPT | Performed by: PHYSICIAN ASSISTANT

## 2023-09-27 ENCOUNTER — TELEPHONE (OUTPATIENT)
Dept: INTERNAL MEDICINE CLINIC | Facility: CLINIC | Age: 83
End: 2023-09-27

## 2023-09-27 DIAGNOSIS — Z12.31 SCREENING MAMMOGRAM, ENCOUNTER FOR: Primary | ICD-10-CM

## 2023-09-27 NOTE — TELEPHONE ENCOUNTER
Patient is requesting an order for her yearly mammogram- MA super visit scheduled on 5-15, she likes to schedule around same time

## 2024-05-15 ENCOUNTER — TELEPHONE (OUTPATIENT)
Dept: INTERNAL MEDICINE CLINIC | Facility: CLINIC | Age: 84
End: 2024-05-15

## 2024-05-15 ENCOUNTER — OFFICE VISIT (OUTPATIENT)
Dept: INTERNAL MEDICINE CLINIC | Facility: CLINIC | Age: 84
End: 2024-05-15

## 2024-05-15 VITALS
HEART RATE: 65 BPM | SYSTOLIC BLOOD PRESSURE: 126 MMHG | HEIGHT: 65 IN | WEIGHT: 164.38 LBS | DIASTOLIC BLOOD PRESSURE: 74 MMHG | BODY MASS INDEX: 27.39 KG/M2 | OXYGEN SATURATION: 94 %

## 2024-05-15 DIAGNOSIS — Z78.0 POST-MENOPAUSAL: ICD-10-CM

## 2024-05-15 DIAGNOSIS — M85.80 OSTEOPENIA, UNSPECIFIED LOCATION: ICD-10-CM

## 2024-05-15 DIAGNOSIS — Z12.31 ENCOUNTER FOR SCREENING MAMMOGRAM FOR BREAST CANCER: ICD-10-CM

## 2024-05-15 DIAGNOSIS — M19.90 OSTEOARTHRITIS, UNSPECIFIED OSTEOARTHRITIS TYPE, UNSPECIFIED SITE: ICD-10-CM

## 2024-05-15 DIAGNOSIS — E78.5 HYPERLIPIDEMIA, UNSPECIFIED HYPERLIPIDEMIA TYPE: ICD-10-CM

## 2024-05-15 DIAGNOSIS — Z00.00 ENCOUNTER FOR ANNUAL HEALTH EXAMINATION: Primary | ICD-10-CM

## 2024-05-15 DIAGNOSIS — R60.0 BILATERAL LEG EDEMA: ICD-10-CM

## 2024-05-15 PROCEDURE — 96160 PT-FOCUSED HLTH RISK ASSMT: CPT | Performed by: INTERNAL MEDICINE

## 2024-05-15 PROCEDURE — G0439 PPPS, SUBSEQ VISIT: HCPCS | Performed by: INTERNAL MEDICINE

## 2024-05-15 PROCEDURE — 99213 OFFICE O/P EST LOW 20 MIN: CPT | Performed by: INTERNAL MEDICINE

## 2024-05-15 NOTE — TELEPHONE ENCOUNTER
Advised patient of Dr. Riley's note. Patient verbalized understanding. Appointment made for today at 4pm with Dr Riley at [facility].

## 2024-05-15 NOTE — PROGRESS NOTES
Subjective:   Aria Valle is a 84 year old female who presents for a MA (Medicare Advantage) Supervisit (Once per calendar year) and scheduled follow up of multiple significant but stable problems.     Patient is here for Medicare advantage AHA visit and follow-up on chronic medical issues as listed below.  Last seen here in November 2022 for the same sort of visit.  No changes made at that time.  Since that time she has seen dermatology and orthopedics.  Full blood work done at the last time the last visit was relatively unremarkable.  Current medications reviewed.  Health maintenance and vaccination status reviewed.  Advanced directives reviewed.    Pt is doing fine except recent swollen ankles, left more than right. She is in [place] Jan to Apr. Swelling started after she came back. No leg pain. Ongoing hand and finger pain; she has seen Ortho; they are requesting surgery. No rx pain meds now. No rx meds at all right now. Diet is not the best. Weight is down 6 pounds. She goes to the gym 5 days a week with chair yoga and aerobics in classes. No tobacco. Very little EtOH. Her eyes burned in [place]; they are all better now. Pt does not think she every had chicken pox; she does not want a shingles shot.        History/Other:   Fall Risk Assessment:   She has been screened for Falls and is High Risk. Fall Prevention information provided to patient in After Visit Summary.    Do you feel unsteady when standing or walking?: No  Do you worry about falling?: Yes  Have you fallen in the past year?: No  Were you injured?: No     Cognitive Assessment:   She had a completely normal cognitive assessment - see flowsheet entries     Functional Ability/Status:   Aria Valle has some abnormal functions as listed below:  She has difficulties Affording Meds based on screening of functional status. She has Vision problems based on screening of functional status. She has problems with Memory based on screening of functional status.        Depression Screening (PHQ-2/PHQ-9): PHQ-2 SCORE: 0  , done 5/15/2024             Advanced Directives:   She does have a Living Will but we do NOT have it on file in Epic.    She does have a POA but we do NOT have it on file in Epic.    Discussed Advance Care Planning with patient (and family/surrogate if present). Standard forms made available to patient in After Visit Summary.      Patient Active Problem List   Diagnosis    Hyperlipidemia    Osteoarthrosis    Former smoker    Bilateral carpal tunnel syndrome    Disorder of bone and cartilage     Allergies:  She is allergic to tetanus toxoid, adsorbed.    Current Medications:  No outpatient medications have been marked as taking for the 5/15/24 encounter (Office Visit) with Len Riley MD.       Medical History:  She  has a past medical history of Cataract.  Surgical History:  She  has a past surgical history that includes incise finger tendon sheath (10/18/2010) and Cataract extraction.   Family History:  Her family history includes Cancer in her brother and father; Heart Disorder in her father and mother; Pancreatic Cancer in her paternal uncle; Pancreatic Cancer (age of onset: 64) in her brother.  Social History:  She  reports that she has quit smoking. She has been exposed to tobacco smoke. She has never used smokeless tobacco. She reports current alcohol use. She reports that she does not use drugs.    Tobacco:  She smoked tobacco in the past but quit greater than 12 months ago.  Social History     Tobacco Use   Smoking Status Former    Passive exposure: Past   Smokeless Tobacco Never          CAGE Alcohol Screen:   CAGE screening score of 0 on 5/15/2024, showing low risk of alcohol abuse.      Patient Care Team:  Len Riley MD as PCP - General (Internal Medicine)    Review of Systems       Objective:   Physical Exam  Constitutional:       Appearance: Normal appearance. She is well-developed.   HENT:      Right Ear: Tympanic membrane and ear  canal normal.      Left Ear: Tympanic membrane and ear canal normal.      Nose: Nose normal.      Mouth/Throat:      Pharynx: No oropharyngeal exudate or posterior oropharyngeal erythema.   Eyes:      Conjunctiva/sclera: Conjunctivae normal.      Pupils: Pupils are equal, round, and reactive to light.   Neck:      Thyroid: No thyromegaly.      Vascular: No carotid bruit.   Cardiovascular:      Rate and Rhythm: Normal rate.      Pulses: Normal pulses.      Heart sounds: Normal heart sounds. No murmur heard.     No friction rub. No gallop.   Pulmonary:      Effort: Pulmonary effort is normal.      Breath sounds: Normal breath sounds. No wheezing or rales.   Abdominal:      General: Bowel sounds are normal.      Palpations: Abdomen is soft. There is no mass.      Tenderness: There is no abdominal tenderness.   Musculoskeletal:         General: No tenderness.      Cervical back: Neck supple.      Right lower leg: Edema present.      Left lower leg: Edema present.      Comments: 1+ pitting edema of both lower legs   Lymphadenopathy:      Cervical: No cervical adenopathy.   Skin:     General: Skin is warm and dry.      Findings: No rash.   Neurological:      General: No focal deficit present.      Mental Status: She is alert.      Cranial Nerves: No cranial nerve deficit.      Coordination: Coordination normal.   Psychiatric:         Mood and Affect: Mood normal.         Behavior: Behavior normal.         Thought Content: Thought content normal.         Judgment: Judgment normal.            /74 (BP Location: Left arm, Patient Position: Sitting)   Pulse 65   Ht 5' 5\" (1.651 m)   Wt 164 lb 6 oz (74.6 kg)   SpO2 94%   BMI 27.35 kg/m²  Estimated body mass index is 27.35 kg/m² as calculated from the following:    Height as of this encounter: 5' 5\" (1.651 m).    Weight as of this encounter: 164 lb 6 oz (74.6 kg).    Medicare Hearing Assessment:   Hearing Screening    Screening Method: Questionnaire  I have a problem  hearing over the telephone: No I have trouble following the conversations when two or more people are talking at the same time: No   I have trouble understanding things on the TV: No I have to strain to understand conversations: No   I have to worry about missing the telephone ring or doorbell: No I have trouble hearing conversations in a noisy background such as a crowded room or restaurant: No   I get confused about where sounds come from: No I misunderstand some words in a sentence and need to ask people to repeat themselves: No   I especially have trouble understanding the speech of women and children: No I have trouble understanding the speaker in a large room such as at a meeting or place of [employer]: No   Many people I talk to seem to mumble (or don't speak clearly): No People get annoyed because I misunderstand what they say: No   I misunderstand what others are saying and make inappropriate responses: No I avoid social activities because I cannot hear well and fear I will reply improperly: No   Family members and friends have told me they think I may have hearing loss: No             Visual Acuity:   Right Eye Visual Acuity: Corrected Right Eye Chart Acuity: 20/25   Left Eye Visual Acuity: Corrected Left Eye Chart Acuity: 20/30   Both Eyes Visual Acuity: Corrected Both Eyes Chart Acuity: 20/25   Able To Tolerate Visual Acuity: Yes        Assessment & Plan:   Aria Valle is a 84 year old female who presents for a Medicare Assessment.     1. Encounter for annual health examination  Exam is relatively unremarkable.  Active issues as below.  Health maintenance issues reviewed.  Advanced directives reviewed.  Encouraged healthy diet and regular exercise.  Follow-up in 1 year everything is okay.    2. Encounter for screening mammogram for breast cancer  Overdue for mammogram.  Given order.  - [facility] AMY 2D+3D SCREENING BILAT (CPT=77067/77908); Future    3. Hyperlipidemia, unspecified hyperlipidemia type  On no  medications at this time.  Check blood work and contact patient with results.  Consider treatment pending results.  - CBC With Differential With Platelet  - Comp Metabolic Panel (14)  - Lipid Panel  - TSH W Reflex To Free T4  - Vitamin B12    4. Osteoarthritis, unspecified osteoarthritis type, unspecified site  Present but stable.  Continue regular follow-up with orthopedics.    5. Osteopenia, unspecified location  Is been several years since her last bone density test.  He has lost height.  Continue with calcium and vitamin D.  Check DEXA scan.  May consider prescription bisphosphonate.    6. Post-menopausal  As above, check bone density test.  - XR DEXA BONE DENSITOMETRY (CPT=77080); Future    7.  Bilateral leg edema  Doubt serious underlying pathology.  She does have underlying varicose veins.  Not really unilateral.  No evidence of DVT on exam.  She has increased her salt intake and is eating at restaurants more since returning from [place].  All of this can contribute.  Encouraged to cut back on salt intake and keep legs elevated.  Consider medium strength knee-high compression stockings.    The patient indicates understanding of these issues and agrees to the plan.  Reinforced healthy diet, lifestyle, and exercise.      No follow-ups on file.     Len Riley MD, 5/15/2024     Supplementary Documentation:   General Health:  In the past six months, have you lost more than 10 pounds without trying?: 2 - No  Has your appetite been poor?: No  Type of Diet: Balanced  How does the patient maintain a good energy level?: Daily Walks;Appropriate Exercise;Stretching  How would you describe your daily physical activity?: Moderate  How would you describe your current health state?: Good  How do you maintain positive mental well-being?: Social Interaction;Puzzles;Games;Visiting Friends;Visiting Family  On a scale of 0 to 10, with 0 being no pain and 10 being severe pain, what is your pain level?: 1 - (Mild)  In the past  six months, have you experienced urine leakage?: 1-Yes  At any time do you feel concerned for the safety/well-being of yourself and/or your children, in your home or elsewhere?: No  Have you had any immunizations at another office such as Influenza, Hepatitis B, Tetanus, or Pneumococcal?: No       Aria Valle's SCREENING SCHEDULE   Tests on this list are recommended by your physician but may not be covered, or covered at this frequency, by your insurer.   Please check with your insurance carrier before scheduling to verify coverage.   PREVENTATIVE SERVICES FREQUENCY &  COVERAGE DETAILS LAST COMPLETION DATE   Diabetes Screening    Fasting Blood Sugar /  Glucose    One screening every 12 months if never tested or if previously tested but not diagnosed with pre-diabetes   One screening every 6 months if diagnosed with pre-diabetes Lab Results   Component Value Date    GLU 87 11/28/2022        Cardiovascular Disease Screening    Lipid Panel  Cholesterol  Lipoprotein (HDL)  Triglycerides Covered every 5 years for all Medicare beneficiaries without apparent signs or symptoms of cardiovascular disease Lab Results   Component Value Date    CHOLEST 222 (H) 11/28/2022    HDL 81 (H) 11/28/2022     (H) 11/28/2022    TRIG 120 11/28/2022         Electrocardiogram (EKG)   Covered if needed at Welcome to Medicare, and non-screening if indicated for medical reasons 11/02/2010      Ultrasound Screening for Abdominal Aortic Aneurysm (AAA) Covered once in a lifetime for one of the following risk factors    Men who are 65-75 years old and have ever smoked    Anyone with a family history -     Colorectal Cancer Screening  Covered for ages 50-85; only need ONE of the following:    Colonoscopy   Covered every 10 years    Covered every 2 years if patient is at high risk or previous colonoscopy was abnormal -    No recommendations at this time    Flexible Sigmoidoscopy   Covered every 4 years -    Fecal Occult Blood Test Covered  annually -   Bone Density Screening    Bone density screening    Covered every 2 years after age 65 if diagnosed with risk of osteoporosis or estrogen deficiency.    Covered yearly for long-term glucocorticoid medication use (Steroids) Last Dexa Scan:    XR DEXA BONE DENSITOMETRY (CPT=77080) 08/25/2020      No recommendations at this time   Pap and Pelvic    Pap   Covered every 2 years for women at normal risk; Annually if at high risk -  No recommendations at this time    Chlamydia Annually if high risk -  No recommendations at this time   Screening Mammogram    Mammogram     Recommend annually for all female patients aged 40 and older    One baseline mammogram covered for patients aged 35-39 11/04/2022    No recommendations at this time    Immunizations    Influenza Covered once per flu season  Please get every year -  No recommendations at this time    Pneumococcal Each vaccine ([id number] & [id number]) covered once after 65 Prevnar 13: 06/12/2017    Rocalmdav52: -     No recommendations at this time    Hepatitis B One screening covered for patients with certain risk factors   -  No recommendations at this time    Tetanus Toxoid Not covered by Medicare Part B unless medically necessary (cut with metal); may be covered with your pharmacy prescription benefits -    Tetanus, Diptheria and Pertusis TD and TDaP Not covered by Medicare Part B -  No recommendations at this time    Zoster Not covered by Medicare Part B; may be covered with your pharmacy  prescription benefits -  Zoster Vaccines(1 of 2) Never done

## 2024-05-15 NOTE — TELEPHONE ENCOUNTER
Call transferred -PATRICK stated patient upset about appointment cancelation for today 8:40    Spoke to patient, stated she need answers, why this keeps happening to her , mentioned this happened 3 times in 2021      Asking if it was her insurance assured it was not that   Stated she have not had labs since 2022 stated she did not want to change Dr's because she like  Dr Riley but do not like how this keeps making her feel     Decline appointment with other providers to be seen earlier     Wants to be seen sooner than August appointment  and Labs , does not matter which location, asked about the Waiting List     Patient wants a Call back today     Please advise

## 2024-05-15 NOTE — PATIENT INSTRUCTIONS
Aria Valle's SCREENING SCHEDULE   Tests on this list are recommended by your physician but may not be covered, or covered at this frequency, by your insurer.   Please check with your insurance carrier before scheduling to verify coverage.   PREVENTATIVE SERVICES FREQUENCY &  COVERAGE DETAILS LAST COMPLETION DATE   Diabetes Screening    Fasting Blood Sugar /  Glucose    One screening every 12 months if never tested or if previously tested but not diagnosed with pre-diabetes   One screening every 6 months if diagnosed with pre-diabetes Lab Results   Component Value Date    GLU 87 11/28/2022        Cardiovascular Disease Screening    Lipid Panel  Cholesterol  Lipoprotein (HDL)  Triglycerides Covered every 5 years for all Medicare beneficiaries without apparent signs or symptoms of cardiovascular disease Lab Results   Component Value Date    CHOLEST 222 (H) 11/28/2022    HDL 81 (H) 11/28/2022     (H) 11/28/2022    TRIG 120 11/28/2022         Electrocardiogram (EKG)   Covered if needed at Welcome to Medicare, and non-screening if indicated for medical reasons 11/02/2010      Ultrasound Screening for Abdominal Aortic Aneurysm (AAA) Covered once in a lifetime for one of the following risk factors   • Men who are 65-75 years old and have ever smoked   • Anyone with a family history -     Colorectal Cancer Screening  Covered for ages 50-85; only need ONE of the following:    Colonoscopy   Covered every 10 years    Covered every 2 years if patient is at high risk or previous colonoscopy was abnormal -    No recommendations at this time    Flexible Sigmoidoscopy   Covered every 4 years -    Fecal Occult Blood Test Covered annually -   Bone Density Screening    Bone density screening    Covered every 2 years after age 65 if diagnosed with risk of osteoporosis or estrogen deficiency.    Covered yearly for long-term glucocorticoid medication use (Steroids) Last Dexa Scan:    XR DEXA BONE DENSITOMETRY (CPT=77080)  08/25/2020      No recommendations at this time   Pap and Pelvic    Pap   Covered every 2 years for women at normal risk; Annually if at high risk -  No recommendations at this time    Chlamydia Annually if high risk -  No recommendations at this time   Screening Mammogram    Mammogram     Recommend annually for all female patients aged 40 and older    One baseline mammogram covered for patients aged 35-39 11/04/2022    No recommendations at this time    Immunizations    Influenza Covered once per flu season  Please get every year -  No recommendations at this time    Pneumococcal Each vaccine ([id number] & [id number]) covered once after 65 Prevnar 13: 06/12/2017    Alejtsdnp61: -     No recommendations at this time    Hepatitis B One screening covered for patients with certain risk factors   -  No recommendations at this time    Tetanus Toxoid Not covered by Medicare Part B unless medically necessary (cut with metal); may be covered with your pharmacy prescription benefits -    Tetanus, Diptheria and Pertusis TD and TDaP Not covered by Medicare Part B -  No recommendations at this time    Zoster Not covered by Medicare Part B; may be covered with your pharmacy  prescription benefits -  Zoster Vaccines(1 of 2) Never done

## 2024-05-17 ENCOUNTER — LAB ENCOUNTER (OUTPATIENT)
Dept: LAB | Age: 84
End: 2024-05-17
Attending: INTERNAL MEDICINE

## 2024-05-17 LAB
ALBUMIN SERPL-MCNC: 4 G/DL (ref 3.2–4.8)
ALBUMIN/GLOB SERPL: 1.4 {RATIO} (ref 1–2)
ALP LIVER SERPL-CCNC: 75 U/L
ALT SERPL-CCNC: 18 U/L
ANION GAP SERPL CALC-SCNC: 6 MMOL/L (ref 0–18)
AST SERPL-CCNC: 19 U/L (ref ?–34)
BASOPHILS # BLD AUTO: 0.06 X10(3) UL (ref 0–0.2)
BASOPHILS NFR BLD AUTO: 1 %
BILIRUB SERPL-MCNC: 0.6 MG/DL (ref 0.2–1.1)
BUN BLD-MCNC: 11 MG/DL (ref 9–23)
BUN/CREAT SERPL: 13.1 (ref 10–20)
CALCIUM BLD-MCNC: 9.6 MG/DL (ref 8.7–10.4)
CHLORIDE SERPL-SCNC: 109 MMOL/L (ref 98–112)
CHOLEST SERPL-MCNC: 209 MG/DL (ref ?–200)
CO2 SERPL-SCNC: 28 MMOL/L (ref 21–32)
CREAT BLD-MCNC: 0.84 MG/DL
DEPRECATED RDW RBC AUTO: 47.8 FL (ref 35.1–46.3)
EGFRCR SERPLBLD CKD-EPI 2021: 68 ML/MIN/1.73M2 (ref 60–?)
EOSINOPHIL # BLD AUTO: 0.2 X10(3) UL (ref 0–0.7)
EOSINOPHIL NFR BLD AUTO: 3.3 %
ERYTHROCYTE [DISTWIDTH] IN BLOOD BY AUTOMATED COUNT: 13.9 % (ref 11–15)
FASTING PATIENT LIPID ANSWER: YES
FASTING STATUS PATIENT QL REPORTED: YES
GLOBULIN PLAS-MCNC: 2.8 G/DL (ref 2–3.5)
GLUCOSE BLD-MCNC: 86 MG/DL (ref 70–99)
HCT VFR BLD AUTO: 46 %
HDLC SERPL-MCNC: 73 MG/DL (ref 40–59)
HGB BLD-MCNC: 15.4 G/DL
IMM GRANULOCYTES # BLD AUTO: 0.02 X10(3) UL (ref 0–1)
IMM GRANULOCYTES NFR BLD: 0.3 %
LDLC SERPL CALC-MCNC: 116 MG/DL (ref ?–100)
LYMPHOCYTES # BLD AUTO: 1.56 X10(3) UL (ref 1–4)
LYMPHOCYTES NFR BLD AUTO: 25.6 %
MCH RBC QN AUTO: 31.2 PG (ref 26–34)
MCHC RBC AUTO-ENTMCNC: 33.5 G/DL (ref 31–37)
MCV RBC AUTO: 93.3 FL
MONOCYTES # BLD AUTO: 0.52 X10(3) UL (ref 0.1–1)
MONOCYTES NFR BLD AUTO: 8.5 %
NEUTROPHILS # BLD AUTO: 3.74 X10 (3) UL (ref 1.5–7.7)
NEUTROPHILS # BLD AUTO: 3.74 X10(3) UL (ref 1.5–7.7)
NEUTROPHILS NFR BLD AUTO: 61.3 %
NONHDLC SERPL-MCNC: 136 MG/DL (ref ?–130)
OSMOLALITY SERPL CALC.SUM OF ELEC: 295 MOSM/KG (ref 275–295)
PLATELET # BLD AUTO: 339 10(3)UL (ref 150–450)
POTASSIUM SERPL-SCNC: 4.4 MMOL/L (ref 3.5–5.1)
PROT SERPL-MCNC: 6.8 G/DL (ref 5.7–8.2)
RBC # BLD AUTO: 4.93 X10(6)UL
SODIUM SERPL-SCNC: 143 MMOL/L (ref 136–145)
TRIGL SERPL-MCNC: 114 MG/DL (ref 30–149)
TSI SER-ACNC: 2.2 MIU/ML (ref 0.55–4.78)
VIT B12 SERPL-MCNC: 428 PG/ML (ref 211–911)
VLDLC SERPL CALC-MCNC: 20 MG/DL (ref 0–30)
WBC # BLD AUTO: 6.1 X10(3) UL (ref 4–11)

## 2024-05-17 PROCEDURE — 82607 VITAMIN B-12: CPT | Performed by: INTERNAL MEDICINE

## 2024-05-17 PROCEDURE — 80053 COMPREHEN METABOLIC PANEL: CPT | Performed by: INTERNAL MEDICINE

## 2024-05-17 PROCEDURE — 80061 LIPID PANEL: CPT | Performed by: INTERNAL MEDICINE

## 2024-05-17 PROCEDURE — 36415 COLL VENOUS BLD VENIPUNCTURE: CPT | Performed by: INTERNAL MEDICINE

## 2024-05-17 PROCEDURE — 85025 COMPLETE CBC W/AUTO DIFF WBC: CPT | Performed by: INTERNAL MEDICINE

## 2024-05-17 PROCEDURE — 84443 ASSAY THYROID STIM HORMONE: CPT | Performed by: INTERNAL MEDICINE

## 2024-05-20 ENCOUNTER — TELEPHONE (OUTPATIENT)
Dept: INTERNAL MEDICINE CLINIC | Facility: CLINIC | Age: 84
End: 2024-05-20

## 2024-05-20 NOTE — TELEPHONE ENCOUNTER
Patient asking for specifics of lab results.  Reviewed breakdown of HDL, LDL etc. Will mail results per patient's request.

## 2024-05-22 ENCOUNTER — HOSPITAL ENCOUNTER (OUTPATIENT)
Dept: MAMMOGRAPHY | Age: 84
Discharge: HOME | End: 2024-05-22
Attending: INTERNAL MEDICINE

## 2024-05-22 ENCOUNTER — HOSPITAL ENCOUNTER (OUTPATIENT)
Dept: BONE DENSITY | Age: 84
Discharge: HOME | End: 2024-05-22
Attending: INTERNAL MEDICINE

## 2024-05-22 DIAGNOSIS — Z78.0 POST-MENOPAUSAL: ICD-10-CM

## 2024-05-22 DIAGNOSIS — Z12.31 ENCOUNTER FOR SCREENING MAMMOGRAM FOR BREAST CANCER: ICD-10-CM

## 2024-05-22 PROCEDURE — 77067 SCR MAMMO BI INCL CAD: CPT | Performed by: INTERNAL MEDICINE

## 2024-05-22 PROCEDURE — 77063 BREAST TOMOSYNTHESIS BI: CPT | Performed by: INTERNAL MEDICINE

## 2024-05-22 PROCEDURE — 77080 DXA BONE DENSITY AXIAL: CPT | Performed by: INTERNAL MEDICINE

## 2024-06-05 ENCOUNTER — OFFICE VISIT (OUTPATIENT)
Dept: INTERNAL MEDICINE CLINIC | Facility: CLINIC | Age: 84
End: 2024-06-05
Payer: MEDICARE

## 2024-06-05 VITALS
DIASTOLIC BLOOD PRESSURE: 70 MMHG | WEIGHT: 163 LBS | SYSTOLIC BLOOD PRESSURE: 126 MMHG | HEIGHT: 65 IN | BODY MASS INDEX: 27.16 KG/M2 | HEART RATE: 64 BPM

## 2024-06-05 DIAGNOSIS — M85.80 OSTEOPENIA, UNSPECIFIED LOCATION: Primary | ICD-10-CM

## 2024-06-05 PROCEDURE — G2211 COMPLEX E/M VISIT ADD ON: HCPCS | Performed by: INTERNAL MEDICINE

## 2024-06-05 PROCEDURE — 99213 OFFICE O/P EST LOW 20 MIN: CPT | Performed by: INTERNAL MEDICINE

## 2024-06-05 NOTE — PROGRESS NOTES
HPI:    Patient ID: Aria Valle is a 84 year old female.    HPI  Patient is here for follow-up on recent bone density test.  I saw her on May 15.  That was her Medicare annual wellness visit.  No changes made at that time.  Full blood work was essentially unremarkable.  Mammogram was normal.  Bone density test was done T-scores were -1.2, -1.1, and -0.2.  The middle value had decreased by 7.9% from previous study.  The other ones had not changed much.  Message sent to patient by covering physician to discuss results with me.  Current medications reviewed.    Patient with no history of fractures.  She stays physically active.  She does chair yoga or senior aerobics 5 days a week.  She spends 4 months a year in [place].  She is there she walks for 3 miles on 5 days a week.  She takes calcium supplement at 1200 mg a day.  That has vitamin D included as well but she has not aware of the dose.      Patient Active Problem List   Diagnosis    Hyperlipidemia    Osteoarthrosis    Former smoker    Bilateral carpal tunnel syndrome    Disorder of bone and cartilage          HISTORY:  Past Medical History:    Cataract      Past Surgical History:   Procedure Laterality Date    Cataract extraction      Incise finger tendon sheath  10/18/2010    Per[facility], [facility]      Family History   Problem Relation Age of Onset    Heart Disorder Mother     Cancer Father         STOMACH    Heart Disorder Father         premature CAD    Cancer Brother     Pancreatic Cancer Brother 64    Pancreatic Cancer Paternal Uncle       Social History     Socioeconomic History    Marital status:    Tobacco Use    Smoking status: Former     Passive exposure: Past    Smokeless tobacco: Never   Vaping Use    Vaping status: Never Used   Substance and Sexual Activity    Alcohol use: Yes     Alcohol/week: 0.0 standard drinks of alcohol    Drug use: No    Sexual activity: Never     Partners: Male   Other Topics  Concern    Caffeine Concern No    Pt has a pacemaker No    Pt has a defibrillator No    Breast feeding No    Reaction to local anesthetic No          Review of Systems          Current Outpatient Medications   Medication Sig Dispense Refill    mupirocin 2 % External Ointment Apply 1 Application topically 3 (three) times daily. 30 g 0    triamcinolone acetonide 0.1 % External Ointment Apply 1 Application topically 2 (two) times daily. 30 g 1    PREVIDENT 5000 SENSITIVE 1.1-5 % Dental Paste APPLY A PEA SIZED AMOUNT TO THE TOOTHBRUSH QD      CALCIUM CITRATE-VITAMIN D3 OR Take 1 tablet by mouth daily.        ASCORBIC ACID CR OR Take 1 tablet by mouth daily.         Allergies:  Allergies   Allergen Reactions    Tetanus Toxoid, Adsorbed OTHER (SEE COMMENTS)     Very swollen and painful at injection site        PHYSICAL EXAM:   There were no vitals taken for this visit.     Physical Exam  Constitutional:       General: She is not in acute distress.     Appearance: Normal appearance.   Musculoskeletal:         General: No deformity.   Neurological:      Mental Status: She is alert.   Psychiatric:         Mood and Affect: Mood normal.          Wt Readings from Last 6 Encounters:   05/15/24 164 lb 6 oz (74.6 kg)   11/28/22 170 lb 6.4 oz (77.3 kg)   10/22/21 166 lb 9.6 oz (75.6 kg)   12/05/20 160 lb (72.6 kg)   12/04/20 160 lb (72.6 kg)   08/10/20 164 lb 11.2 oz (74.7 kg)             ASSESSMENT/PLAN:   1. Osteopenia, unspecified location  Patient with osteopenia on recent study.  The one reading was down to 7.9%.  Other readings were down just slightly.  Spent time going over these results and discussing the significance.  There is no need to start any new prescription medications at this time.  Encouraged to continue with calcium 1200 mg a day and vitamin D 800 international units a day.  Continue with regular weightbearing activity on most days of the week.  Repeat bone density test in 2 years.        Approximately 10  minutes was spent with the patient. This is  direct face-to-face conversation regarding her medical condition.  Additional 10 minutes was spent in chart review initially and postvisit documentation.    Meds This Visit:  Requested Prescriptions      No prescriptions requested or ordered in this encounter       Imaging & Referrals:  None         Len Riley MD

## 2024-08-14 ENCOUNTER — OFFICE VISIT (OUTPATIENT)
Dept: DERMATOLOGY CLINIC | Facility: CLINIC | Age: 84
End: 2024-08-14

## 2024-08-14 DIAGNOSIS — L82.1 SEBORRHEIC KERATOSIS: ICD-10-CM

## 2024-08-14 DIAGNOSIS — L81.4 LENTIGO: Primary | ICD-10-CM

## 2024-08-14 DIAGNOSIS — D18.01 CHERRY HEMANGIOMA: ICD-10-CM

## 2024-08-14 DIAGNOSIS — Z12.83 SKIN CANCER SCREENING: ICD-10-CM

## 2024-08-14 DIAGNOSIS — D22.9 MULTIPLE NEVI: ICD-10-CM

## 2024-08-14 PROCEDURE — 99213 OFFICE O/P EST LOW 20 MIN: CPT | Performed by: DERMATOLOGY

## 2024-08-14 NOTE — PROGRESS NOTES
Aria Valle is a 84 year old female.  HPI:     CC:    Chief Complaint   Patient presents with    Full Skin Exam     LOV 05/05/23. Pt presents for FBSE. Pt denies any concern.    Pt denies personal/family hx of Skin Ca           HISTORY:    Past Medical History:    Cataract      Past Surgical History:   Procedure Laterality Date    Cataract extraction      Incise finger tendon sheath  10/18/2010    Performed by CIARA CHERY at [facility] [place]      Family History   Problem Relation Age of Onset    Heart Disorder Mother     Cancer Father         STOMACH    Heart Disorder Father         premature CAD    Cancer Brother     Pancreatic Cancer Brother 64    Pancreatic Cancer Paternal Uncle       Social History     Socioeconomic History    Marital status:    Tobacco Use    Smoking status: Former     Passive exposure: Past    Smokeless tobacco: Never   Vaping Use    Vaping status: Never Used   Substance and Sexual Activity    Alcohol use: Yes     Alcohol/week: 0.0 standard drinks of alcohol    Drug use: No    Sexual activity: Never     Partners: Male   Other Topics Concern    Caffeine Concern No    Pt has a pacemaker No    Pt has a defibrillator No    Breast feeding No    Reaction to local anesthetic No        Current Outpatient Medications   Medication Sig Dispense Refill    PREVIDENT 5000 SENSITIVE 1.1-5 % Dental Paste APPLY A PEA SIZED AMOUNT TO THE TOOTHBRUSH QD      CALCIUM CITRATE-VITAMIN D3 OR Take 1 tablet by mouth daily.        ASCORBIC ACID CR OR Take 1 tablet by mouth daily.        mupirocin 2 % External Ointment Apply 1 Application topically 3 (three) times daily. (Patient not taking: Reported on 8/14/2024) 30 g 0    triamcinolone acetonide 0.1 % External Ointment Apply 1 Application topically 2 (two) times daily. (Patient not taking: Reported on 8/14/2024) 30 g 1     Allergies:   Allergies   Allergen Reactions    Tetanus Toxoid, Adsorbed OTHER (SEE COMMENTS)     Very swollen and painful at  injection site       Past Medical History:    Cataract     Past Surgical History:   Procedure Laterality Date    Cataract extraction      Incise finger tendon sheath  10/18/2010    Performed by CIARA CHERY at [facility]     Social History     Socioeconomic History    Marital status:      Spouse name: Not on file    Number of children: Not on file    Years of education: Not on file    Highest education level: Not on file   Occupational History    Not on file   Tobacco Use    Smoking status: Former     Passive exposure: Past    Smokeless tobacco: Never   Vaping Use    Vaping status: Never Used   Substance and Sexual Activity    Alcohol use: Yes     Alcohol/week: 0.0 standard drinks of alcohol    Drug use: No    Sexual activity: Never     Partners: Male   Other Topics Concern     Service Not Asked    Blood Transfusions Not Asked    Caffeine Concern No    Occupational Exposure Not Asked    Hobby Hazards Not Asked    Sleep Concern Not Asked    Stress Concern Not Asked    Weight Concern Not Asked    Special Diet Not Asked    Back Care Not Asked    Exercise Not Asked    Bike Helmet Not Asked    Seat Belt Not Asked    Self-Exams Not Asked    Grew up on a farm Not Asked    History of tanning Not Asked    Outdoor occupation Not Asked    Pt has a pacemaker No    Pt has a defibrillator No    Breast feeding No    Reaction to local anesthetic No   Social History Narrative    Not on file     Social Determinants of Health     Financial Resource Strain: Not on file   Food Insecurity: Not on file   Transportation Needs: Not on file   Physical Activity: Not on file   Stress: Not on file   Social Connections: Not on file   Housing Stability: Not on file     Family History   Problem Relation Age of Onset    Heart Disorder Mother     Cancer Father         STOMACH    Heart Disorder Father         premature CAD    Cancer Brother     Pancreatic Cancer Brother 64    Pancreatic Cancer Paternal Uncle         There were no vitals filed for this visit.    HPI:  Chief Complaint   Patient presents with    Full Skin Exam     LOV 05/05/23. Pt presents for FBSE. Pt denies any concern.    Pt denies personal/family hx of Skin Ca       No personal  or family history of skin cancer  Patient for skin exam.  History of hemangioma removed by Dr. Philip 2020    Patient presents with concerns above.    Patient has been in their usual state of health.     Past notes/ records and appropriate/relevant lab results including pathology and past body maps reviewed. Including outside notes/ PCP notes as appropriate. Updated and new information noted in current visit.     ROS:  Denies other relevant systemic complaints.      History, medications, allergies reviewed as noted.    Allergies:  Tetanus toxoid, adsorbed     Physical Examination:     Well-developed well-nourished patient alert oriented in no acute distress.  Exam performed, including scalp, head, neck, face,nails, hair, external eyes, including conjunctival mucosa, eyelids, lips external ears , arms, digits,palms.     Multiple light to medium brown, well marginated, uniformly pigmented, macules and papules 6 mm and less scattered on exam. pigmented lesions examined with dermoscopy benign-appearing patterns.     Waxy tannish keratotic papules scattered, cherry-red vascular papules scattered.    See map today's date for lesions noted .  See assessment and plan below for specific lesions.    Otherwise remarkable for lesions as noted on map.    See A/P  below for additional information:    Assessment / plan:    No orders of the defined types were placed in this encounter.      Meds & Refills for this Visit:  Requested Prescriptions      No prescriptions requested or ordered in this encounter         Encounter Diagnoses   Name Primary?    Lentigo Yes    Seborrheic keratosis     Cherry hemangioma     Multiple nevi     Skin cancer screening      Lentigo left upper lip slight atrophy in  this area slight erythema no scale monitor.  Early AK likely if this becomes scaly we will plan follow-up consider cryo.  Observe presently    Numerous benign lentigos keratoses including left forearm, lower legs reassurance  Numerous cherry angiomas reassurance      Benign-appearing nevi, no atypical features on dermoscopy reassurance given monitor.     Waxy tan keratotic papules lesions in areas of concern as noted reassurance given.  Benign nature discussed.  Possibility of cryo, alphahydroxy acids over-the-counter retinol's discussed.     No other susupicious lesions on todays  exam.    Please refer to map for specific lesions.  See additional diagnoses.  Pros cons of various therapies, risks benefits discussed.Pathophysiology discussed with patient.  Therapeutic options reviewed.  See  Medications in grid.  Instructions reviewed at length.    Benign nevi, seborrheic  keratoses, cherry angiomas:  Reassurance regarding other benign skin lesions.Signs and symptoms of skin cancer, ABCDE's of melanoma discussed with patient. Sunscreen (broad-spectrum, ideally mineral-based-UVA/UVB -SPF 30 or higher) use encouraged, sun protection/sun protective clothing, self exams reviewed Followup as noted RTC ---routine checkup    6 mos -one year or p.r.n.    Encounter Times   Including precharting, reviewing chart, prior notes obtaining history: 10 minutes, medical exam :10 minutes, notes on body map, plan, counseling 10minutes My total time spent caring for the patient on the day of the encounter: 30 minutes     The patient indicates understanding of these issues and agrees to the plan.  The patient is asked to return as noted in follow-up/ above.    This note was generated using Dragon voice recognition software.  Please contact me regarding any confusion resulting from errors in recognition..  Note to patient and family: The 21st Century Cures Act makes medical notes like these available to patients. However, be advised this  is a medical document. It is intended as [id number] communication and monitoring of a patient's care needs. It is written in medical language and may contain abbreviations or verbiage that are unfamiliar. It may appear blunt or direct. Medical documents are intended to carry relevant information, facts as evident and the clinical opinion of the practitioner.

## 2025-05-31 ENCOUNTER — OFFICE VISIT (OUTPATIENT)
Dept: INTERNAL MEDICINE CLINIC | Facility: CLINIC | Age: 85
End: 2025-05-31
Payer: MEDICARE

## 2025-05-31 ENCOUNTER — LAB ENCOUNTER (OUTPATIENT)
Dept: LAB | Age: 85
End: 2025-05-31
Attending: INTERNAL MEDICINE
Payer: MEDICARE

## 2025-05-31 VITALS
OXYGEN SATURATION: 98 % | DIASTOLIC BLOOD PRESSURE: 76 MMHG | RESPIRATION RATE: 20 BRPM | TEMPERATURE: 98 F | SYSTOLIC BLOOD PRESSURE: 126 MMHG | BODY MASS INDEX: 26.79 KG/M2 | HEIGHT: 65 IN | HEART RATE: 66 BPM | WEIGHT: 160.81 LBS

## 2025-05-31 DIAGNOSIS — Z00.00 ENCOUNTER FOR ANNUAL HEALTH EXAMINATION: Primary | ICD-10-CM

## 2025-05-31 DIAGNOSIS — M19.041 ARTHRITIS OF BOTH HANDS: ICD-10-CM

## 2025-05-31 DIAGNOSIS — R60.0 BILATERAL LEG EDEMA: ICD-10-CM

## 2025-05-31 DIAGNOSIS — M85.80 OSTEOPENIA, UNSPECIFIED LOCATION: ICD-10-CM

## 2025-05-31 DIAGNOSIS — M19.042 ARTHRITIS OF BOTH HANDS: ICD-10-CM

## 2025-05-31 DIAGNOSIS — E78.5 HYPERLIPIDEMIA, UNSPECIFIED HYPERLIPIDEMIA TYPE: ICD-10-CM

## 2025-05-31 DIAGNOSIS — Z12.31 ENCOUNTER FOR SCREENING MAMMOGRAM FOR BREAST CANCER: ICD-10-CM

## 2025-05-31 LAB
BASOPHILS # BLD AUTO: 0.08 X10(3) UL (ref 0–0.2)
BASOPHILS NFR BLD AUTO: 1.2 %
DEPRECATED RDW RBC AUTO: 49.2 FL (ref 35.1–46.3)
EOSINOPHIL # BLD AUTO: 0.27 X10(3) UL (ref 0–0.7)
EOSINOPHIL NFR BLD AUTO: 3.9 %
ERYTHROCYTE [DISTWIDTH] IN BLOOD BY AUTOMATED COUNT: 14 % (ref 11–15)
ERYTHROCYTE [SEDIMENTATION RATE] IN BLOOD: 40 MM/HR (ref 0–30)
HCT VFR BLD AUTO: 48.5 % (ref 35–48)
HGB BLD-MCNC: 15.4 G/DL (ref 12–16)
IMM GRANULOCYTES # BLD AUTO: 0.01 X10(3) UL (ref 0–1)
IMM GRANULOCYTES NFR BLD: 0.1 %
LYMPHOCYTES # BLD AUTO: 1.98 X10(3) UL (ref 1–4)
LYMPHOCYTES NFR BLD AUTO: 28.8 %
MCH RBC QN AUTO: 30 PG (ref 26–34)
MCHC RBC AUTO-ENTMCNC: 31.8 G/DL (ref 31–37)
MCV RBC AUTO: 94.5 FL (ref 80–100)
MONOCYTES # BLD AUTO: 0.54 X10(3) UL (ref 0.1–1)
MONOCYTES NFR BLD AUTO: 7.8 %
NEUTROPHILS # BLD AUTO: 4 X10 (3) UL (ref 1.5–7.7)
NEUTROPHILS # BLD AUTO: 4 X10(3) UL (ref 1.5–7.7)
NEUTROPHILS NFR BLD AUTO: 58.2 %
PLATELET # BLD AUTO: 298 10(3)UL (ref 150–450)
RBC # BLD AUTO: 5.13 X10(6)UL (ref 3.8–5.3)
RHEUMATOID FACT SERPL-ACNC: >900 IU/ML (ref ?–14)
WBC # BLD AUTO: 6.9 X10(3) UL (ref 4–11)

## 2025-05-31 PROCEDURE — 85652 RBC SED RATE AUTOMATED: CPT | Performed by: INTERNAL MEDICINE

## 2025-05-31 PROCEDURE — 86431 RHEUMATOID FACTOR QUANT: CPT | Performed by: INTERNAL MEDICINE

## 2025-05-31 PROCEDURE — 86140 C-REACTIVE PROTEIN: CPT | Performed by: INTERNAL MEDICINE

## 2025-05-31 PROCEDURE — 36415 COLL VENOUS BLD VENIPUNCTURE: CPT | Performed by: INTERNAL MEDICINE

## 2025-05-31 PROCEDURE — 85025 COMPLETE CBC W/AUTO DIFF WBC: CPT | Performed by: INTERNAL MEDICINE

## 2025-05-31 PROCEDURE — 84443 ASSAY THYROID STIM HORMONE: CPT | Performed by: INTERNAL MEDICINE

## 2025-05-31 PROCEDURE — 80053 COMPREHEN METABOLIC PANEL: CPT | Performed by: INTERNAL MEDICINE

## 2025-05-31 PROCEDURE — 80061 LIPID PANEL: CPT | Performed by: INTERNAL MEDICINE

## 2025-05-31 NOTE — PROGRESS NOTES
Subjective:   Aria Valle is a 85 year old female who presents for a MA AHA (Medicare Advantage Annual Health Assessment) and Subsequent Annual Wellness visit (Pt already had Initial Annual Wellness) and scheduled follow up of multiple significant but stable problems.   History of Present Illness    Patient is here requesting Medicare annual wellness visit and follow-up on chronic medical issues as listed below.  Last seen here June of last year for osteopenia.  Since that time she has seen physiatry, orthopedics, neurology.  Mostly for hand issues.  History of carpal tunnel syndrome.  Current medications reviewed.  Health maintenance and vaccination status reviewed.  Advanced directives reviewed.    Last year, she had surgery on a cyst in the tendon of left middle finger. That finger is ok. She has a history of shots to the thumbs. She has pain in her hands that affects different fingers at different times. No current palpable lump; her fingers are swollen. Rheumatoid factor done in 2012 was high at 265.     Still with ankles swollen; typically in the morning; some pain but not bad; they are stiff when they get swollen. Has a lump on her left baso of neck; felt to be a cyst in the past.     No tobacco. Little use of EtOH. Diet is about the same; she eats a lot of veggies. She goes to the  and does floor exercises for about an hour on 5 days a week.       History/Other:   Fall Risk Assessment:   She has been screened for Falls and is low risk.      Cognitive Assessment:   She had a completely normal cognitive assessment - see flowsheet entries     Functional Ability/Status:   Aria Valle has some abnormal functions as listed below:  She has Vision problems based on screening of functional status.       Depression Screening (PHQ):  PHQ-2 SCORE: 0  , done 5/31/2025   If you checked off any problems, how difficult have these problems made it for you to do your work, take care of things at home, or get along with  other people?: Not difficult at all    Last [place] Suicide Screening on 5/31/2025 was No Risk.          Advanced Directives:   She does NOT have a Living Will. [Do you have a living will?: No]  She does NOT have a Power of  for Health Care. [Do you have a healthcare power of ?: No]  Discussed Advance Care Planning with patient (and family/surrogate if present). Standard forms made available to patient in After Visit Summary.      Patient Active Problem List   Diagnosis    Hyperlipidemia    Osteoarthrosis    Former smoker    Bilateral carpal tunnel syndrome    Disorder of bone and cartilage     Allergies:  She is allergic to tetanus toxoid, adsorbed.    Current Medications:  Outpatient Medications Marked as Taking for the 5/31/25 encounter (Office Visit) with Len Riley MD   Medication Sig    mupirocin 2 % External Ointment Apply 1 Application topically 3 (three) times daily.    triamcinolone acetonide 0.1 % External Ointment Apply 1 Application topically 2 (two) times daily.    PREVIDENT 5000 SENSITIVE 1.1-5 % Dental Paste APPLY A PEA SIZED AMOUNT TO THE TOOTHBRUSH QD    CALCIUM CITRATE-VITAMIN D3 OR Take 1 tablet by mouth daily.      ASCORBIC ACID CR OR Take 1 tablet by mouth daily.         Medical History:  She  has a past medical history of Cataract.  Surgical History:  She  has a past surgical history that includes incise finger tendon sheath (10/18/2010) and Cataract extraction.   Family History:  Her family history includes Cancer in her brother and father; Heart Disorder in her father and mother; Pancreatic Cancer in her paternal uncle; Pancreatic Cancer (age of onset: 64) in her brother.  Social History:  She  reports that she has quit smoking. She has been exposed to tobacco smoke. She has never used smokeless tobacco. She reports current alcohol use. She reports that she does not use drugs.    Tobacco:  She smoked tobacco in the past but quit greater than 12 months ago.  Social  History     Tobacco Use   Smoking Status Former    Passive exposure: Past   Smokeless Tobacco Never        CAGE Alcohol Screen:   CAGE screening score of 0 on 5/31/2025, showing low risk of alcohol abuse.      Patient Care Team:  Len Riley MD as PCP - General (Internal Medicine)    Review of Systems       Objective:   Physical Exam  Constitutional:       Appearance: Normal appearance. She is well-developed.   HENT:      Right Ear: Tympanic membrane and ear canal normal.      Left Ear: Tympanic membrane and ear canal normal.      Nose: Nose normal.      Mouth/Throat:      Pharynx: No oropharyngeal exudate or posterior oropharyngeal erythema.   Eyes:      Conjunctiva/sclera: Conjunctivae normal.      Pupils: Pupils are equal, round, and reactive to light.   Neck:      Thyroid: No thyromegaly.      Vascular: No carotid bruit.   Cardiovascular:      Rate and Rhythm: Normal rate.      Pulses: Normal pulses.      Heart sounds: Normal heart sounds. No murmur heard.     No friction rub. No gallop.   Pulmonary:      Effort: Pulmonary effort is normal.      Breath sounds: Normal breath sounds. No wheezing or rales.   Abdominal:      General: Bowel sounds are normal.      Palpations: Abdomen is soft. There is no mass.      Tenderness: There is no abdominal tenderness.   Musculoskeletal:         General: No tenderness.      Cervical back: Neck supple.      Right lower leg: No edema.      Left lower leg: No edema.      Comments: Both hands showed multiple swollen and arthritic joints.   Lymphadenopathy:      Cervical: No cervical adenopathy.   Skin:     General: Skin is warm and dry.      Findings: No rash.   Neurological:      General: No focal deficit present.      Mental Status: She is alert.      Cranial Nerves: No cranial nerve deficit.      Coordination: Coordination normal.   Psychiatric:         Mood and Affect: Mood normal.         Behavior: Behavior normal.         Thought Content: Thought content normal.          Judgment: Judgment normal.            /76 (BP Location: Left arm, Patient Position: Sitting, Cuff Size: large)   Pulse 66   Temp 97.8 °F (36.6 °C) (Other)   Resp 20   Ht 5' 5\" (1.651 m)   Wt 160 lb 12.8 oz (72.9 kg)   SpO2 98%   BMI 26.76 kg/m²  Estimated body mass index is 26.76 kg/m² as calculated from the following:    Height as of this encounter: 5' 5\" (1.651 m).    Weight as of this encounter: 160 lb 12.8 oz (72.9 kg).    Medicare Hearing Assessment:   Hearing Screening    Screening Method: Questionnaire  I have a problem hearing over the telephone: No I have trouble following the conversations when two or more people are talking at the same time: No   I have trouble understanding things on the TV: No I have to strain to understand conversations: No   I have to worry about missing the telephone ring or doorbell: No I have trouble hearing conversations in a noisy background such as a crowded room or restaurant: No   I get confused about where sounds come from: No I misunderstand some words in a sentence and need to ask people to repeat themselves: No   I especially have trouble understanding the speech of women and children: No I have trouble understanding the speaker in a large room such as at a meeting or place of Catholic: No   Many people I talk to seem to mumble (or don't speak clearly): No People get annoyed because I misunderstand what they say: No   I misunderstand what others are saying and make inappropriate responses: No I avoid social activities because I cannot hear well and fear I will reply improperly: No   Family members and friends have told me they think I may have hearing loss: No             Visual Acuity:   Right Eye Visual Acuity: Corrected Right Eye Chart Acuity: 20/25   Left Eye Visual Acuity: Corrected Left Eye Chart Acuity: 20/30   Both Eyes Visual Acuity: Corrected Both Eyes Chart Acuity: 20/25   Able To Tolerate Visual Acuity: Yes        Assessment & Plan:   Aria Valle  is a 85 year old female who presents for a Medicare Assessment.     1. Encounter for annual health examination  Physical exam is unremarkable other than the hand issues.  Active issues as below.  Health maintenance issues reviewed.  Overall the patient is doing quite well given her advanced age.  Other than the arthritis issues, she is doing well.  Encouraged continue with healthy diet and regular exercise.  Advanced directives in place.    2. Encounter for screening mammogram for breast cancer  Given order for screening mammogram.  Given her excellent health despite her age, I think mammograms are indicated at this time.  - [facility] AMY 2D+3D SCREENING BILAT (CPT=77067/82931); Future    3. Hyperlipidemia, unspecified hyperlipidemia type  On no medications at this time.  Check full blood work.  Contact patient with results.  Consider treatment if sufficiently elevated numbers.  - CBC With Differential With Platelet  - Comp Metabolic Panel (14)  - Lipid Panel  - TSH W Reflex To Free T4    4. Osteopenia, unspecified location  Stable.  Continue with calcium and vitamin D intake.    5. Bilateral leg edema  As some swelling in the morning and during the day.  It is mild at this time.  She can consider using medium strength knee-high compression stockings.  Would not suggest any interventions on the varicose veins or medications.    6. Arthritis of both hands  This is the patient's main issue.  Arthritis multiple joints of both hands.  Swelling and stiffness.  Rheumatoid factor done over 10 years ago was quite elevated as stated above.  Suspicion for rheumatoid arthritis.  Check blood work now.  If positive, consider evaluation by rheumatology.  If blood work is negative, consider orthopedic hand specialist.  - Sed Rate, Westergren (Automated) [E]  - Rheumatoid Arthritis Factor [E]  - C-Reactive Protein [E]      Assessment & Plan      The patient indicates understanding of these issues and agrees to the plan.  Reinforced  healthy diet, lifestyle, and exercise.      No follow-ups on file.     Len Riley MD, 5/31/2025     Supplementary Documentation:   General Health:  In the past six months, have you lost more than 10 pounds without trying?: 2 - No  Has your appetite been poor?: No  Type of Diet: Balanced  How does the patient maintain a good energy level?: Appropriate Exercise, Stretching  How would you describe your daily physical activity?: Moderate  How would you describe your current health state?: Good  How do you maintain positive mental well-being?: Social Interaction, Puzzles, Games, Visiting Family  On a scale of 0 to 10, with 0 being no pain and 10 being severe pain, what is your pain level?: 3 - (Mild)  In the past six months, have you experienced urine leakage?: 1-Yes  At any time do you feel concerned for the safety/well-being of yourself and/or your children, in your home or elsewhere?: No  Have you had any immunizations at another office such as Influenza, Hepatitis B, Tetanus, or Pneumococcal?: No    Health Maintenance   Topic Date Due    Zoster Vaccines (1 of 2) Never done    COVID-19 Vaccine (4 - 2024-25 season) 09/01/2024    Annual Well Visit  01/01/2025    Influenza Vaccine (Season Ended) 10/01/2025    DEXA Scan  Completed    Annual Depression Screening  Completed    Fall Risk Screening (Annual)  Completed    Pneumococcal Vaccine: 50+ Years  Completed    Meningococcal B Vaccine  Aged Out

## 2025-06-02 ENCOUNTER — TELEPHONE (OUTPATIENT)
Dept: INTERNAL MEDICINE CLINIC | Facility: CLINIC | Age: 85
End: 2025-06-02

## 2025-06-02 ENCOUNTER — HOSPITAL ENCOUNTER (OUTPATIENT)
Dept: MAMMOGRAPHY | Facility: HOSPITAL | Age: 85
Discharge: HOME | End: 2025-06-02
Attending: INTERNAL MEDICINE
Payer: MEDICARE

## 2025-06-02 DIAGNOSIS — Z12.31 ENCOUNTER FOR SCREENING MAMMOGRAM FOR BREAST CANCER: ICD-10-CM

## 2025-06-02 DIAGNOSIS — E78.5 HYPERLIPIDEMIA, UNSPECIFIED HYPERLIPIDEMIA TYPE: Primary | ICD-10-CM

## 2025-06-02 PROCEDURE — 77067 SCR MAMMO BI INCL CAD: CPT | Performed by: INTERNAL MEDICINE

## 2025-06-02 PROCEDURE — 77063 BREAST TOMOSYNTHESIS BI: CPT | Performed by: INTERNAL MEDICINE

## 2025-06-02 NOTE — TELEPHONE ENCOUNTER
I have generated the order for the blood test to be done at her visit a couple of days ago.  That order should be enough as it includes the lipid panel.  In regards to rheumatology, please contact their office and see if the patient can be seen sometime before December.  She can be seen by any of the rheumatologist there.  Please work with the patient to get her in sooner.

## 2025-06-02 NOTE — TELEPHONE ENCOUNTER
Spoke with patient, Date of Birth verified  She stated she saw Dr Riley last week 5/30 and had labs done and received her results.   She made an appt with Dr Trujillo ([facility]he[facility]) on 12/5. Pt stated she still has swollen fingers, hands and both ankle what else she can do in the mean time?  Also she wants to know if she can get her order for yearly lipids?   Lab order pended.   pls advise, thanks in advance.         Future Appointments   Date Time Provider Department Center   6/2/2025  3:00 PM [facility] RM1 [facility] EM [facility]   12/5/2025 12:20 PM [facility]

## 2025-06-02 NOTE — TELEPHONE ENCOUNTER
Called the patient to let her know of the lipids, asked if she asked to see the first available doctor with rheumatology or only Dr. Trujillo. She said she asked for any doctor and there was nothing sooner.     Called rheumatology office, they stated they do not have anything sooner with any doctor, they are booking until January. Dr. Riley, ANDREA.     Called the patient to let her know there was nothing sooner available.

## 2025-06-03 ENCOUNTER — LAB ENCOUNTER (OUTPATIENT)
Dept: LAB | Age: 85
End: 2025-06-03
Attending: INTERNAL MEDICINE
Payer: MEDICARE

## 2025-06-03 DIAGNOSIS — M19.042 ARTHRITIS OF BOTH HANDS: ICD-10-CM

## 2025-06-03 DIAGNOSIS — E78.5 HYPERLIPIDEMIA, UNSPECIFIED HYPERLIPIDEMIA TYPE: ICD-10-CM

## 2025-06-03 DIAGNOSIS — M19.041 ARTHRITIS OF BOTH HANDS: ICD-10-CM

## 2025-06-03 LAB
ALBUMIN SERPL-MCNC: 4.3 G/DL (ref 3.2–4.8)
ALBUMIN/GLOB SERPL: 1.6 {RATIO} (ref 1–2)
ALP LIVER SERPL-CCNC: 74 U/L (ref 55–142)
ALT SERPL-CCNC: 8 U/L (ref 10–49)
ANION GAP SERPL CALC-SCNC: 6 MMOL/L (ref 0–18)
AST SERPL-CCNC: 16 U/L (ref ?–34)
BILIRUB SERPL-MCNC: 0.7 MG/DL (ref 0.2–1.1)
BUN BLD-MCNC: 17 MG/DL (ref 9–23)
BUN/CREAT SERPL: 15.3 (ref 10–20)
CALCIUM BLD-MCNC: 9.6 MG/DL (ref 8.7–10.4)
CHLORIDE SERPL-SCNC: 106 MMOL/L (ref 98–112)
CHOLEST SERPL-MCNC: 196 MG/DL (ref ?–200)
CO2 SERPL-SCNC: 27 MMOL/L (ref 21–32)
CREAT BLD-MCNC: 1.11 MG/DL (ref 0.55–1.02)
CRP SERPL-MCNC: <0.4 MG/DL (ref ?–1)
EGFRCR SERPLBLD CKD-EPI 2021: 49 ML/MIN/1.73M2 (ref 60–?)
FASTING PATIENT LIPID ANSWER: YES
FASTING STATUS PATIENT QL REPORTED: YES
GLOBULIN PLAS-MCNC: 2.7 G/DL (ref 2–3.5)
GLUCOSE BLD-MCNC: 86 MG/DL (ref 70–99)
HDLC SERPL-MCNC: 77 MG/DL (ref 40–59)
LDLC SERPL CALC-MCNC: 102 MG/DL (ref ?–100)
NONHDLC SERPL-MCNC: 119 MG/DL (ref ?–130)
OSMOLALITY SERPL CALC.SUM OF ELEC: 289 MOSM/KG (ref 275–295)
POTASSIUM SERPL-SCNC: 4.5 MMOL/L (ref 3.5–5.1)
PROT SERPL-MCNC: 7 G/DL (ref 5.7–8.2)
SODIUM SERPL-SCNC: 139 MMOL/L (ref 136–145)
TRIGL SERPL-MCNC: 95 MG/DL (ref 30–149)
TSI SER-ACNC: 1.5 UIU/ML (ref 0.55–4.78)
VLDLC SERPL CALC-MCNC: 16 MG/DL (ref 0–30)

## 2025-06-03 PROCEDURE — 80061 LIPID PANEL: CPT

## 2025-06-03 PROCEDURE — 80053 COMPREHEN METABOLIC PANEL: CPT

## 2025-06-03 PROCEDURE — 84443 ASSAY THYROID STIM HORMONE: CPT

## 2025-06-03 PROCEDURE — 86140 C-REACTIVE PROTEIN: CPT

## 2025-06-03 PROCEDURE — 36415 COLL VENOUS BLD VENIPUNCTURE: CPT

## 2025-06-03 RX ORDER — MELOXICAM 7.5 MG/1
7.5 TABLET ORAL DAILY
Qty: 30 TABLET | Refills: 5 | Status: SHIPPED | OUTPATIENT
Start: 2025-06-03

## 2025-06-03 NOTE — TELEPHONE ENCOUNTER
Dr. Riley - patient mentioned she has been taking Inge aspirin 81 mg at night only. RN recommended not taking aspirin with Meloxicam. Please advise if this is not correct.     Spoke to patient, full name and date of birth verified.  Informed patient of message from Dr. Riley - patient agreeable to trying the Meloxicam 7.5 mg, asked for this to be sent to [facility] #[id number].    RN sent the prescription today.    Patient scheduled follow up appointment on Monday 8/11/25 at 11:40 AM with Dr. Riley.     Patient stated she started taking a Inge 81 mg aspirin at night, and that seems to be helping with the swelling.     RN informed patient she should hold the aspirin to prevent possible stomach upset/ulcer.     Patient stated she will stop the aspirin and start the Meloxicam.     RN encouraged patient to call office back if she is not having any relief at all, we can schedule her sooner.     Patient verbalized understanding and agreement with plan of care.

## 2025-06-03 NOTE — TELEPHONE ENCOUNTER
Given the delay in rheumatology appointment, we will start treatment now for the patient's rheumatoid arthritis.  Will start with low-dose meloxicam 7.5 mg a day.  I have pended this order.  She can start on this.  It is a low dose but we will see if it can be effective in relieving her pain.  She should keep the appointment with the rheumatologist in December.  I would like to see her back in the office in 2 or 3 months to follow-up on the treatment.  She can see either me or nurse practitioner Court Augustine to follow-up on the arthritis treatment.

## 2025-06-06 NOTE — TELEPHONE ENCOUNTER
Spoke with patient, ANIL verified.   Notified to start meloxicam and stop the aspirin.  She stated understanding.

## 2025-08-11 ENCOUNTER — OFFICE VISIT (OUTPATIENT)
Dept: INTERNAL MEDICINE CLINIC | Facility: CLINIC | Age: 85
End: 2025-08-11

## 2025-08-11 VITALS
RESPIRATION RATE: 20 BRPM | OXYGEN SATURATION: 95 % | SYSTOLIC BLOOD PRESSURE: 126 MMHG | TEMPERATURE: 98 F | BODY MASS INDEX: 26.39 KG/M2 | DIASTOLIC BLOOD PRESSURE: 76 MMHG | HEIGHT: 65 IN | WEIGHT: 158.38 LBS | HEART RATE: 62 BPM

## 2025-08-11 DIAGNOSIS — M05.9 RHEUMATOID ARTHRITIS WITH POSITIVE RHEUMATOID FACTOR, INVOLVING UNSPECIFIED SITE (HCC): Primary | ICD-10-CM

## 2025-08-11 PROCEDURE — G2211 COMPLEX E/M VISIT ADD ON: HCPCS | Performed by: INTERNAL MEDICINE

## 2025-08-11 PROCEDURE — 1159F MED LIST DOCD IN RCRD: CPT | Performed by: INTERNAL MEDICINE

## 2025-08-11 PROCEDURE — 1126F AMNT PAIN NOTED NONE PRSNT: CPT | Performed by: INTERNAL MEDICINE

## 2025-08-11 PROCEDURE — 99213 OFFICE O/P EST LOW 20 MIN: CPT | Performed by: INTERNAL MEDICINE

## 2025-08-11 PROCEDURE — 1160F RVW MEDS BY RX/DR IN RCRD: CPT | Performed by: INTERNAL MEDICINE

## 2025-08-11 RX ORDER — PREDNISONE 20 MG/1
20 TABLET ORAL 2 TIMES DAILY
Qty: 10 TABLET | Refills: 0 | Status: SHIPPED | OUTPATIENT
Start: 2025-08-11 | End: 2025-08-16

## (undated) NOTE — MR AVS SNAPSHOT
[address] [address]  [phone]               Thank you for choosing us for your health care visit with Shellie Sheehan MD.  We are glad to serve you and happy to provide you with this summar Return in about 1 year (around 6/12/2018) for Yearly Physical with fasting labs. Scheduling Instructions     Monday June 12, 2017     Imaging:  EDUARD SCREENING BILAT ([id number])    Instructions:   To schedule a test at any [address] [place] [address] Road insurance company's guidelines for prior authorization for this test.  You may be held responsible for payment in full if proper authorization is not acquired.   Please contact the Patient Business Office at [phone] if you have   any questions related 10/05/2015 245*     TRIGLYCERIDES (mg/dL)   Date Value   10/05/2015 119        EKG - covered if needed at Welcome to Medicare, and non-screening if indicated for medical reasons Electrocardiogram date Routine EKG is not a screening covered service except a Pap: Every 3 yrs age 21-65 or Pap+HPV every 5 yrs age 33-67, Covered every 2 yrs up to age 79 or Yearly if High Risk   There are no preventive care reminders to display for this patient.      Chlamydia  Annually if high risk No results found for: CHLAMYDIA A link to the [employer]. This site has a lot of good information including definitions of the different types of Advance Directives.  It also has the State forms available on it's website for anyone to review and print using their home Educational Information     Healthy Diet and Regular Exercise  The [employer] of [facility] [address] for making healthy food choices  -   Enjoy your food, but eat less. Fully enjoy your food when eating. Don’t eat while distracted and slow down.    Avoid

## (undated) NOTE — MR AVS SNAPSHOT
[address] [address]  [phone]               Thank you for choosing us for your health care visit with Afshan Carballo MD.  We are glad to serve you and happy to provide you with this summar Reason for Today's Visit     Other           Medical Issues Discussed Today     Adverse reaction to vaccine, initial encounter    -  Primary      Instructions and Information about Your Health    Benardyl 25 mg pills, 1-2 pills every 6 hours if neede not sign up before the expiration date, you must request a new code. Your unique [employer] Access Code: [email or web address]  Expires: 8/11/2017  9:16 AM    If you have questions, you can call [phone] to talk to our [facility] Staff.  Remember, [employer]

## (undated) NOTE — LETTER
AUTHORIZATION FOR SURGICAL OPERATION OR OTHER PROCEDURE    1.  I hereby authorize Dr. Braydon Arroyo, [facility] assigned to my case to perform the following operation and/or procedure at the [facility]:    ____________________________ Time:  ________ A. M.  P.M.        Patient Name:  Valentina Crook A__________________________________________________  (please print)      Patient signature:  ___________________________________________________             Relationship to Pa

## (undated) NOTE — LETTER
June 22, 2018     [address]      Dear Edith Crowell:    Below are the results from your recent visit:    Resulted Orders   COMP METABOLIC PANEL (14)   Result Value Ref Range    Glucose 87 70 - 99 mg/dL    Sodium 141 1 The blood sugar (glucose) level is normal. There is no evidence of diabetes.     The electrolytes levels in the blood are normal.    The kidney and liver function tests are all normal.    The HDL cholesterol and triglycerides are normal.  The total choleste

## (undated) NOTE — Clinical Note
June 18, 2017     [address] [facility]      Dear Kristal Oh:    Below are the results from your recent visit:    Resulted Orders   OCCULT BLOOD, FECAL, IMMUNOASSAY   Result Value Ref Range    Occult Blood Negative Negative

## (undated) NOTE — LETTER
May 31, 2019     Calossae Sanches  [facility] [address] [address]      Dear Peggy Anderson:    Below are the results from your recent visit:    Resulted Orders   COMP METABOLIC PANEL (14)   Result Value Ref Range    Glucose 82 70 - 99 mg/dL    Sodium 1 Basophil Absolute 0.06 0.00 - 0.20 x10(3) uL    Immature Granulocyte Absolute 0.01 0.00 - 1.00 x10(3) uL    Neutrophil % 49.2 %    Lymphocyte % 36.8 %    Monocyte % 9.7 %    Eosinophil % 2.9 %    Basophil % 1.2 %    Immature Granulocyte % 0.2 %     The bl

## (undated) NOTE — LETTER
August 11, 2020     [address] [address]      Dear Que Estrada:    Below are the results from your recent visit:    Resulted Orders   COMP METABOLIC PANEL (14)   Result Value Ref Range    Glucose 82 70 - 99 mg/dL    [place]odiu Immature Granulocyte Absolute 0.02 0.00 - 1.00 x10(3) uL    Neutrophil % 55.4 %    Lymphocyte % 32.2 %    Monocyte % 8.4 %    Eosinophil % 2.7 %    Basophil % 1.0 %    Immature Granulocyte % 0.3 %                           Yandy Ma     The blood cell

## (undated) NOTE — LETTER
May 20, 2024    Aria Valle  [address] [facility] [address]      Dear Aria:    The following are the results of your recent tests. Please review the list of test results.  Your result is the value on the left; we have also supplied the range of normal (low and high) values.    Results for orders placed or performed in visit on 05/15/24   Comp Metabolic Panel (14)   Result Value Ref Range    Glucose 86 70 - 99 mg/dL    Sodium 143 136 - 145 mmol/L    Potassium 4.4 3.5 - 5.1 mmol/L    Chloride 109 98 - 112 mmol/L    CO2 28.0 21.0 - 32.0 mmol/L    Anion Gap 6 0 - 18 mmol/L    BUN 11 9 - 23 mg/dL    Creatinine 0.84 0.55 - 1.02 mg/dL    BUN/CREA Ratio 13.1 10.0 - 20.0    Calcium, Total 9.6 8.7 - 10.4 mg/dL    Calculated Osmolality 295 275 - 295 mOsm/kg    eGFR-Cr 68 >=60 mL/min/1.73m2    ALT 18 10 - 49 U/L    AST 19 <=34 U/L    Alkaline Phosphatase 75 55 - 142 U/L    Bilirubin, Total 0.6 0.2 - 1.1 mg/dL    Total Protein 6.8 5.7 - 8.2 g/dL    Albumin 4.0 3.2 - 4.8 g/dL    Globulin  2.8 2.0 - 3.5 g/dL    A/G Ratio 1.4 1.0 - 2.0    Patient Fasting for CMP? Yes    Lipid Panel   Result Value Ref Range    Cholesterol, Total 209 (H) <200 mg/dL    HDL Cholesterol 73 (H) 40 - 59 mg/dL    Triglycerides 114 30 - 149 mg/dL    LDL Cholesterol 116 (H) <100 mg/dL    VLDL 20 0 - 30 mg/dL    Non HDL Chol 136 (H) <130 mg/dL    Patient Fasting for Lipid? Yes    TSH W Reflex To Free T4   Result Value Ref Range    TSH 2.200 0.550 - 4.780 mIU/mL   Vitamin B12   Result Value Ref Range    Vitamin B12 428 211 - 911 pg/mL   CBC W/ DIFFERENTIAL   Result Value Ref Range    WBC 6.1 4.0 - 11.0 x10(3) uL    RBC 4.93 3.80 - 5.30 x10(6)uL    HGB 15.4 12.0 - 16.0 g/dL    HCT 46.0 35.0 - 48.0 %    MCV 93.3 80.0 - 100.0 fL    MCH 31.2 26.0 - 34.0 pg    MCHC 33.5 31.0 - 37.0 g/dL    RDW-SD 47.8 (H) 35.1 - 46.3 fL    RDW 13.9 11.0 - 15.0 %    .0 150.0 - 450.0 10(3)uL    Neutrophil Absolute Prelim 3.74 1.50 - 7.70 x10 (3) uL     Neutrophil Absolute 3.74 1.50 - 7.70 x10(3) uL    Lymphocyte Absolute 1.56 1.00 - 4.00 x10(3) uL    Monocyte Absolute 0.52 0.10 - 1.00 x10(3) uL    Eosinophil Absolute 0.20 0.00 - 0.70 x10(3) uL    Basophil Absolute 0.06 0.00 - 0.20 x10(3) uL    Immature Granulocyte Absolute 0.02 0.00 - 1.00 x10(3) uL    Neutrophil % 61.3 %    Lymphocyte % 25.6 %    Monocyte % 8.5 %    Eosinophil % 3.3 %    Basophil % 1.0 %    Immature Granulocyte % 0.3 %       Please call if you have further questions,      Dr. Len Kirkpatrick MD

## (undated) NOTE — LETTER
August 27, 2020     [address] [address]      Dear Emani Dus:    Below are the results from your recent visit:    Resulted Orders   [facility] AMY 2D+3D SCREENING BILAT (APF=[id number])    Narrative      FINDINGS: There is n

## (undated) NOTE — LETTER
June 24, 2017     [address]      Dear [place]:    Below are the results from your recent visit:    Resulted Orders   COMP METABOLIC PANEL (14)   Result Value Ref Range    Glucose 84 70 - 99 mg/dL    Sodium 142 1 The blood cell count is normal. There is no evidence of anemia or infection. The blood sugar (glucose) level is normal. There is no evidence of diabetes.     The electrolytes levels in the blood are normal.    The kidney and liver function tests are all